# Patient Record
Sex: MALE | Race: WHITE | NOT HISPANIC OR LATINO | Employment: OTHER | URBAN - METROPOLITAN AREA
[De-identification: names, ages, dates, MRNs, and addresses within clinical notes are randomized per-mention and may not be internally consistent; named-entity substitution may affect disease eponyms.]

---

## 2023-09-05 DIAGNOSIS — M54.50 CHRONIC LOW BACK PAIN, UNSPECIFIED BACK PAIN LATERALITY, UNSPECIFIED WHETHER SCIATICA PRESENT: Primary | ICD-10-CM

## 2023-09-05 DIAGNOSIS — G89.29 CHRONIC LOW BACK PAIN, UNSPECIFIED BACK PAIN LATERALITY, UNSPECIFIED WHETHER SCIATICA PRESENT: Primary | ICD-10-CM

## 2023-09-05 PROBLEM — R53.83 FATIGUE: Status: ACTIVE | Noted: 2023-09-05

## 2023-09-05 PROBLEM — M79.89 LEFT LEG SWELLING: Status: ACTIVE | Noted: 2023-09-05

## 2023-09-05 PROBLEM — M25.519 SHOULDER PAIN: Status: ACTIVE | Noted: 2023-09-05

## 2023-09-05 PROBLEM — I71.40 AAA (ABDOMINAL AORTIC ANEURYSM) (CMS-HCC): Status: ACTIVE | Noted: 2023-09-05

## 2023-09-05 PROBLEM — M67.919 DISORDER OF ROTATOR CUFF: Status: ACTIVE | Noted: 2023-09-05

## 2023-09-05 PROBLEM — I73.9 PAD (PERIPHERAL ARTERY DISEASE) (CMS-HCC): Status: ACTIVE | Noted: 2023-09-05

## 2023-09-05 PROBLEM — M50.30 DDD (DEGENERATIVE DISC DISEASE), CERVICAL: Status: ACTIVE | Noted: 2023-09-05

## 2023-09-05 PROBLEM — G54.6: Status: ACTIVE | Noted: 2023-09-05

## 2023-09-05 PROBLEM — R39.89 ABNORMAL PROSTATE EXAM: Status: ACTIVE | Noted: 2023-09-05

## 2023-09-05 PROBLEM — N40.0 BPH (BENIGN PROSTATIC HYPERPLASIA): Status: ACTIVE | Noted: 2023-09-05

## 2023-09-05 PROBLEM — S30.1XXA GROIN HEMATOMA: Status: ACTIVE | Noted: 2023-09-05

## 2023-09-05 PROBLEM — T81.89XA PROBLEM INVOLVING SURGICAL INCISION: Status: ACTIVE | Noted: 2023-09-05

## 2023-09-05 PROBLEM — I73.9 CLAUDICATION, INTERMITTENT (CMS-HCC): Status: ACTIVE | Noted: 2023-09-05

## 2023-09-05 PROBLEM — R10.30 SEVERE GROIN PAIN: Status: ACTIVE | Noted: 2023-09-05

## 2023-09-05 PROBLEM — I70.202 FEMORAL ARTERY OCCLUSION, LEFT (CMS-HCC): Status: ACTIVE | Noted: 2023-09-05

## 2023-09-05 PROBLEM — C34.91 ADENOCARCINOMA OF RIGHT LUNG (MULTI): Status: ACTIVE | Noted: 2023-09-05

## 2023-09-05 PROBLEM — R53.83 MALAISE AND FATIGUE: Status: ACTIVE | Noted: 2023-09-05

## 2023-09-05 PROBLEM — G89.18 POST-OP PAIN: Status: ACTIVE | Noted: 2023-09-05

## 2023-09-05 PROBLEM — L30.1 DYSHIDROSIS: Status: ACTIVE | Noted: 2023-09-05

## 2023-09-05 PROBLEM — R20.2 PARESTHESIAS: Status: ACTIVE | Noted: 2023-09-05

## 2023-09-05 PROBLEM — R53.81 MALAISE AND FATIGUE: Status: ACTIVE | Noted: 2023-09-05

## 2023-09-05 PROBLEM — R30.0 DYSURIA: Status: ACTIVE | Noted: 2023-09-05

## 2023-09-05 PROBLEM — E78.5 HYPERLIPIDEMIA: Status: ACTIVE | Noted: 2023-09-05

## 2023-09-05 PROBLEM — R60.0 EDEMA LEG: Status: ACTIVE | Noted: 2023-09-05

## 2023-09-05 PROBLEM — Z85.118 HISTORY OF LUNG CANCER: Status: ACTIVE | Noted: 2023-09-05

## 2023-09-05 PROBLEM — R07.81 RIB PAIN: Status: ACTIVE | Noted: 2023-09-05

## 2023-09-05 PROBLEM — Z97.8 USES PROSTHESIS: Status: ACTIVE | Noted: 2023-09-05

## 2023-09-05 PROBLEM — S78.111A UNILATERAL AKA, RIGHT (MULTI): Status: ACTIVE | Noted: 2023-09-05

## 2023-09-05 PROBLEM — R39.9: Status: ACTIVE | Noted: 2023-09-05

## 2023-09-05 PROBLEM — M79.605 PAIN OF LEFT LOWER EXTREMITY: Status: ACTIVE | Noted: 2023-09-05

## 2023-09-05 PROBLEM — J44.9 COPD (CHRONIC OBSTRUCTIVE PULMONARY DISEASE) (MULTI): Status: ACTIVE | Noted: 2023-09-05

## 2023-09-05 PROBLEM — R26.2 DIFFICULTY WALKING: Status: ACTIVE | Noted: 2023-09-05

## 2023-09-05 PROBLEM — L71.9 ROSACEA: Status: ACTIVE | Noted: 2023-09-05

## 2023-09-05 PROBLEM — F41.9 ANXIETY: Status: ACTIVE | Noted: 2023-09-05

## 2023-09-05 PROBLEM — M54.2 CERVICAL PAIN: Status: ACTIVE | Noted: 2023-09-05

## 2023-09-05 PROBLEM — R73.9 HYPERGLYCEMIA: Status: ACTIVE | Noted: 2023-09-05

## 2023-09-05 PROBLEM — L21.0 SEBORRHEA CAPITIS: Status: ACTIVE | Noted: 2023-09-05

## 2023-09-05 PROBLEM — M54.16 ACUTE LUMBAR RADICULOPATHY: Status: ACTIVE | Noted: 2023-09-05

## 2023-09-05 PROBLEM — R53.1 WEAKNESS GENERALIZED: Status: ACTIVE | Noted: 2023-09-05

## 2023-09-05 PROBLEM — I25.10 CAD (CORONARY ARTERY DISEASE): Status: ACTIVE | Noted: 2023-09-05

## 2023-09-05 PROBLEM — K40.90 INGUINAL HERNIA, LEFT: Status: ACTIVE | Noted: 2023-09-05

## 2023-09-05 PROBLEM — R07.89 PAIN, CHEST WALL: Status: ACTIVE | Noted: 2023-09-05

## 2023-09-05 PROBLEM — K42.9 UMBILICAL HERNIA: Status: ACTIVE | Noted: 2023-09-05

## 2023-09-05 PROBLEM — M79.604 PAIN OF RIGHT LOWER EXTREMITY: Status: ACTIVE | Noted: 2023-09-05

## 2023-09-05 PROBLEM — S20.20XA CONTUSION, TRUNK: Status: ACTIVE | Noted: 2023-09-05

## 2023-09-05 PROBLEM — R10.9 ABDOMINAL PAIN: Status: ACTIVE | Noted: 2023-09-05

## 2023-09-05 PROBLEM — R58 ECCHYMOSIS: Status: ACTIVE | Noted: 2023-09-05

## 2023-09-05 PROBLEM — J02.9 PHARYNGITIS: Status: RESOLVED | Noted: 2023-09-05 | Resolved: 2023-09-05

## 2023-09-05 PROBLEM — M62.830 BACK MUSCLE SPASM: Status: ACTIVE | Noted: 2023-09-05

## 2023-09-05 RX ORDER — DEXAMETHASONE SODIUM PHOSPHATE 1 MG/ML
SOLUTION/ DROPS OPHTHALMIC
COMMUNITY
Start: 2022-05-09 | End: 2024-01-31 | Stop reason: ALTCHOICE

## 2023-09-05 RX ORDER — MOMETASONE FUROATE 200 UG/1
AEROSOL RESPIRATORY (INHALATION)
COMMUNITY
Start: 2022-02-01 | End: 2023-12-02

## 2023-09-05 RX ORDER — ALBUTEROL SULFATE 90 UG/1
AEROSOL, METERED RESPIRATORY (INHALATION)
COMMUNITY
Start: 2020-07-14

## 2023-09-05 RX ORDER — ACETAMINOPHEN, DIPHENHYDRAMINE HCL, PHENYLEPHRINE HCL 325; 25; 5 MG/1; MG/1; MG/1
TABLET ORAL
COMMUNITY
End: 2024-01-31 | Stop reason: ALTCHOICE

## 2023-09-05 RX ORDER — ASPIRIN 81 MG/1
1 TABLET ORAL DAILY
COMMUNITY
Start: 2021-05-28 | End: 2024-01-31 | Stop reason: ALTCHOICE

## 2023-09-05 RX ORDER — OLOPATADINE HYDROCHLORIDE 2 MG/ML
SOLUTION/ DROPS OPHTHALMIC
COMMUNITY
End: 2024-01-31 | Stop reason: ALTCHOICE

## 2023-09-05 RX ORDER — CLOBETASOL PROPIONATE 0.46 MG/ML
SOLUTION TOPICAL
COMMUNITY

## 2023-09-05 RX ORDER — TIOTROPIUM BROMIDE AND OLODATEROL 3.124; 2.736 UG/1; UG/1
SPRAY, METERED RESPIRATORY (INHALATION)
COMMUNITY
Start: 2022-05-03 | End: 2023-11-02 | Stop reason: ALTCHOICE

## 2023-09-05 RX ORDER — TIOTROPIUM BROMIDE INHALATION SPRAY 3.12 UG/1
2 SPRAY, METERED RESPIRATORY (INHALATION) DAILY
COMMUNITY
Start: 2023-05-24

## 2023-09-05 RX ORDER — CLOPIDOGREL BISULFATE 75 MG/1
75 TABLET ORAL DAILY
COMMUNITY
End: 2024-02-22

## 2023-09-05 RX ORDER — PEMBROLIZUMAB 25 MG/ML
INJECTION, SOLUTION INTRAVENOUS
COMMUNITY
End: 2023-11-02 | Stop reason: ALTCHOICE

## 2023-09-05 RX ORDER — CYCLOBENZAPRINE HCL 10 MG
10 TABLET ORAL 3 TIMES DAILY PRN
COMMUNITY
End: 2023-12-02

## 2023-09-05 RX ORDER — ATORVASTATIN CALCIUM 40 MG/1
1 TABLET, FILM COATED ORAL DAILY
COMMUNITY
Start: 2019-06-06 | End: 2024-01-31 | Stop reason: ALTCHOICE

## 2023-09-05 RX ORDER — TRIAMCINOLONE ACETONIDE 0.25 MG/G
CREAM TOPICAL
COMMUNITY
Start: 2023-02-23

## 2023-09-05 RX ORDER — CYCLOBENZAPRINE HCL 10 MG
10 TABLET ORAL 3 TIMES DAILY PRN
Qty: 90 TABLET | Refills: 0 | Status: SHIPPED | OUTPATIENT
Start: 2023-09-05 | End: 2023-11-02 | Stop reason: SDUPTHER

## 2023-09-05 RX ORDER — TIOTROPIUM BROMIDE 18 UG/1
CAPSULE ORAL; RESPIRATORY (INHALATION)
COMMUNITY
Start: 2022-11-11 | End: 2023-11-02 | Stop reason: ALTCHOICE

## 2023-09-05 RX ORDER — ACETAMINOPHEN 325 MG/1
TABLET ORAL
COMMUNITY
Start: 2021-05-21

## 2023-09-05 RX ORDER — FOLIC ACID 1 MG/1
1 TABLET ORAL DAILY
COMMUNITY
End: 2024-01-31 | Stop reason: ALTCHOICE

## 2023-09-05 RX ORDER — PROCHLORPERAZINE MALEATE 10 MG
1 TABLET ORAL EVERY 8 HOURS PRN
COMMUNITY
Start: 2022-03-18 | End: 2024-01-31 | Stop reason: ALTCHOICE

## 2023-09-05 RX ORDER — TAMSULOSIN HYDROCHLORIDE 0.4 MG/1
0.8 CAPSULE ORAL NIGHTLY
COMMUNITY
End: 2024-02-28 | Stop reason: SDUPTHER

## 2023-09-05 RX ORDER — ONDANSETRON 4 MG/1
TABLET, FILM COATED ORAL
COMMUNITY
End: 2024-01-31 | Stop reason: ALTCHOICE

## 2023-09-05 RX ORDER — KETOCONAZOLE 20 MG/ML
SHAMPOO, SUSPENSION TOPICAL
COMMUNITY

## 2023-09-05 RX ORDER — PREDNISONE 5 MG/1
TABLET ORAL
COMMUNITY
Start: 2022-07-12 | End: 2023-11-02 | Stop reason: ALTCHOICE

## 2023-09-05 RX ORDER — DEXAMETHASONE 4 MG/1
TABLET ORAL
COMMUNITY
Start: 2022-03-18 | End: 2024-01-31 | Stop reason: ALTCHOICE

## 2023-09-25 DIAGNOSIS — Z12.5 SCREENING FOR PROSTATE CANCER: ICD-10-CM

## 2023-09-25 DIAGNOSIS — Z13.220 LIPID SCREENING: Primary | ICD-10-CM

## 2023-09-25 DIAGNOSIS — L71.9 ROSACEA: ICD-10-CM

## 2023-09-25 RX ORDER — METRONIDAZOLE 7.5 MG/G
15 GEL TOPICAL 2 TIMES DAILY
COMMUNITY
End: 2023-09-25 | Stop reason: SDUPTHER

## 2023-09-26 RX ORDER — METRONIDAZOLE 7.5 MG/G
1 GEL TOPICAL 2 TIMES DAILY
Qty: 30 G | Refills: 0 | Status: SHIPPED | OUTPATIENT
Start: 2023-09-26 | End: 2023-11-02 | Stop reason: ALTCHOICE

## 2023-10-25 ENCOUNTER — LAB (OUTPATIENT)
Dept: LAB | Facility: LAB | Age: 68
End: 2023-10-25
Payer: MEDICARE

## 2023-10-25 DIAGNOSIS — L71.9 ROSACEA: ICD-10-CM

## 2023-10-25 DIAGNOSIS — Z12.5 SCREENING FOR PROSTATE CANCER: ICD-10-CM

## 2023-10-25 DIAGNOSIS — Z13.220 LIPID SCREENING: ICD-10-CM

## 2023-10-25 LAB
CHOLEST SERPL-MCNC: 177 MG/DL (ref 0–199)
CHOLESTEROL/HDL RATIO: 6.4
HDLC SERPL-MCNC: 27.8 MG/DL
LDLC SERPL CALC-MCNC: 113 MG/DL
NON HDL CHOLESTEROL: 149 MG/DL (ref 0–149)
TRIGL SERPL-MCNC: 179 MG/DL (ref 0–149)
VLDL: 36 MG/DL (ref 0–40)

## 2023-10-25 PROCEDURE — 36415 COLL VENOUS BLD VENIPUNCTURE: CPT

## 2023-10-25 PROCEDURE — G0103 PSA SCREENING: HCPCS

## 2023-10-25 PROCEDURE — 80061 LIPID PANEL: CPT

## 2023-10-26 DIAGNOSIS — R97.20 ELEVATED PSA: Primary | ICD-10-CM

## 2023-10-26 LAB — PSA SERPL-MCNC: 4.28 NG/ML

## 2023-11-02 ENCOUNTER — OFFICE VISIT (OUTPATIENT)
Dept: PRIMARY CARE | Facility: CLINIC | Age: 68
End: 2023-11-02
Payer: MEDICARE

## 2023-11-02 VITALS
OXYGEN SATURATION: 96 % | SYSTOLIC BLOOD PRESSURE: 150 MMHG | HEIGHT: 67 IN | HEART RATE: 58 BPM | BODY MASS INDEX: 30.45 KG/M2 | WEIGHT: 194 LBS | DIASTOLIC BLOOD PRESSURE: 80 MMHG

## 2023-11-02 DIAGNOSIS — J02.9 PHARYNGITIS, UNSPECIFIED ETIOLOGY: ICD-10-CM

## 2023-11-02 DIAGNOSIS — R97.20 ELEVATED PSA: ICD-10-CM

## 2023-11-02 DIAGNOSIS — N40.1 BENIGN PROSTATIC HYPERPLASIA WITH URINARY FREQUENCY: ICD-10-CM

## 2023-11-02 DIAGNOSIS — J43.9 PULMONARY EMPHYSEMA, UNSPECIFIED EMPHYSEMA TYPE (MULTI): ICD-10-CM

## 2023-11-02 DIAGNOSIS — C34.91 ADENOCARCINOMA OF RIGHT LUNG (MULTI): Primary | ICD-10-CM

## 2023-11-02 DIAGNOSIS — R35.0 BENIGN PROSTATIC HYPERPLASIA WITH URINARY FREQUENCY: ICD-10-CM

## 2023-11-02 DIAGNOSIS — B37.9 CANDIDIASIS: ICD-10-CM

## 2023-11-02 PROBLEM — G54.6: Status: RESOLVED | Noted: 2023-09-05 | Resolved: 2023-11-02

## 2023-11-02 PROCEDURE — 1159F MED LIST DOCD IN RCRD: CPT | Performed by: FAMILY MEDICINE

## 2023-11-02 PROCEDURE — 1125F AMNT PAIN NOTED PAIN PRSNT: CPT | Performed by: FAMILY MEDICINE

## 2023-11-02 PROCEDURE — 1036F TOBACCO NON-USER: CPT | Performed by: FAMILY MEDICINE

## 2023-11-02 PROCEDURE — 99214 OFFICE O/P EST MOD 30 MIN: CPT | Performed by: FAMILY MEDICINE

## 2023-11-02 PROCEDURE — 1160F RVW MEDS BY RX/DR IN RCRD: CPT | Performed by: FAMILY MEDICINE

## 2023-11-02 RX ORDER — NYSTATIN 100000 [USP'U]/ML
500000 SUSPENSION ORAL 4 TIMES DAILY
Qty: 200 ML | Refills: 0 | Status: SHIPPED | OUTPATIENT
Start: 2023-11-02 | End: 2023-11-12

## 2023-11-02 ASSESSMENT — ENCOUNTER SYMPTOMS: SINUS PAIN: 0

## 2023-11-02 NOTE — PATIENT INSTRUCTIONS
Elevated PSA and BPH: See urology, referral is in the computer : Olivier Gould     You may take over-the-counter medications as needed for symptom relief.  Call the office if your symptoms worsen such as high fever and worsening cough or increase in symptoms.       Lung cancer continued follow-up with oncology    Candidiasis: Nystatin as directed   Referred To Plastics For Closure Text (Leave Blank If You Do Not Want): After obtaining clear surgical margins the patient was sent to plastics for surgical repair.  The patient understands they will receive post-surgical care and follow-up from the referring physician's office.

## 2023-11-02 NOTE — PROGRESS NOTES
"Subjective   Patient ID: Lauri Dolan is a 68 y.o. male who presents for Sore Throat (Pt in for sore throat.  Pt states his throat is scratchy, having trouble swallowing pills at night.  Onset few months.   Pt asking about urologist.  ).    Phlegm in throat   Trouble getting pills down   No pain with swallowing ,    For a few months      BPH : some burning at beginning of urination   Still have frequency   Elevated PSA       COPD: Here for follow-up on COPD.  Not taking spiriva much  occ albuterol  , seeing Dr Wright   Patient is currently taking maintenance inhalers, occasional symptomatic inhalers.  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 Negative for congestion and sinus pain.        Objective   /80   Pulse 58   Ht 1.702 m (5' 7\")   Wt 88 kg (194 lb)   SpO2 96%   BMI 30.38 kg/m²     Physical Exam  Constitutional:       Appearance: Normal appearance.   HENT:      Head: Normocephalic and atraumatic.      Right Ear: Tympanic membrane and ear canal normal.      Left Ear: Tympanic membrane and ear canal normal.      Nose: No nasal deformity.      Right Sinus: No maxillary sinus tenderness or frontal sinus tenderness.      Left Sinus: No maxillary sinus tenderness or frontal sinus tenderness.      Mouth/Throat:      Mouth: No oral lesions.      Tongue: No lesions.      Comments: Right eye red uvula, back of the throat, pharyngeal and " parapharyngeal area  White patches along the parapharyngeal area  Neck:      Comments: Thyroid midline no nodules  Cardiovascular:      Rate and Rhythm: Normal rate and regular rhythm.   Pulmonary:      Effort: Pulmonary effort is normal.      Breath sounds: Normal breath sounds.   Musculoskeletal:      Cervical back: No tenderness.   Lymphadenopathy:      Cervical: No cervical adenopathy.   Neurological:      Mental Status: He is alert.   Psychiatric:         Mood and Affect: Mood normal.         Assessment/Plan   Problem List Items Addressed This Visit             ICD-10-CM    Adenocarcinoma of right lung (CMS/[facility]) - Primary C34.91    BPH (benign prostatic hyperplasia) N40.0    COPD (chronic obstructive pulmonary disease) (CMS/[facility]) J44.9     Other Visit Diagnoses         Codes    Elevated PSA     R97.20    Pharyngitis, unspecified etiology     J02.9    Relevant Medications    nystatin (Mycostatin) 100,000 unit/mL suspension    Candidiasis     B37.9    Relevant Medications    nystatin (Mycostatin) 100,000 unit/mL suspension

## 2023-11-24 ENCOUNTER — LAB (OUTPATIENT)
Dept: LAB | Facility: HOSPITAL | Age: 68
End: 2023-11-24
Payer: MEDICARE

## 2023-11-24 ENCOUNTER — HOSPITAL ENCOUNTER (OUTPATIENT)
Dept: RADIOLOGY | Facility: HOSPITAL | Age: 68
Discharge: HOME | End: 2023-11-24
Payer: MEDICARE

## 2023-11-24 DIAGNOSIS — C34.31 MALIGNANT NEOPLASM OF LOWER LOBE, RIGHT BRONCHUS OR LUNG (MULTI): ICD-10-CM

## 2023-11-24 DIAGNOSIS — C34.90 MALIGNANT NEOPLASM OF UNSPECIFIED PART OF UNSPECIFIED BRONCHUS OR LUNG (MULTI): ICD-10-CM

## 2023-11-24 DIAGNOSIS — E03.9 HYPOTHYROIDISM, UNSPECIFIED: ICD-10-CM

## 2023-11-24 LAB
ALBUMIN SERPL BCP-MCNC: 4.1 G/DL (ref 3.4–5)
ALP SERPL-CCNC: 57 U/L (ref 33–136)
ALT SERPL W P-5'-P-CCNC: 26 U/L (ref 10–52)
ANION GAP SERPL CALC-SCNC: 10 MMOL/L (ref 10–20)
AST SERPL W P-5'-P-CCNC: 18 U/L (ref 9–39)
BASOPHILS # BLD AUTO: 0.02 X10*3/UL (ref 0–0.1)
BASOPHILS NFR BLD AUTO: 0.5 %
BILIRUB SERPL-MCNC: 0.5 MG/DL (ref 0–1.2)
BUN SERPL-MCNC: 11 MG/DL (ref 6–23)
CALCIUM SERPL-MCNC: 8.3 MG/DL (ref 8.6–10.3)
CHLORIDE SERPL-SCNC: 104 MMOL/L (ref 98–107)
CO2 SERPL-SCNC: 27 MMOL/L (ref 21–32)
CREAT SERPL-MCNC: 0.6 MG/DL (ref 0.5–1.3)
EOSINOPHIL # BLD AUTO: 0.14 X10*3/UL (ref 0–0.7)
EOSINOPHIL NFR BLD AUTO: 3.2 %
ERYTHROCYTE [DISTWIDTH] IN BLOOD BY AUTOMATED COUNT: 12.6 % (ref 11.5–14.5)
GFR SERPL CREATININE-BSD FRML MDRD: >90 ML/MIN/1.73M*2
GLUCOSE SERPL-MCNC: 100 MG/DL (ref 74–99)
HCT VFR BLD AUTO: 42.5 % (ref 41–52)
HGB BLD-MCNC: 14 G/DL (ref 13.5–17.5)
IMM GRANULOCYTES # BLD AUTO: 0.01 X10*3/UL (ref 0–0.7)
IMM GRANULOCYTES NFR BLD AUTO: 0.2 % (ref 0–0.9)
LYMPHOCYTES # BLD AUTO: 0.74 X10*3/UL (ref 1.2–4.8)
LYMPHOCYTES NFR BLD AUTO: 16.8 %
MCH RBC QN AUTO: 32 PG (ref 26–34)
MCHC RBC AUTO-ENTMCNC: 32.9 G/DL (ref 32–36)
MCV RBC AUTO: 97 FL (ref 80–100)
MONOCYTES # BLD AUTO: 0.38 X10*3/UL (ref 0.1–1)
MONOCYTES NFR BLD AUTO: 8.6 %
NEUTROPHILS # BLD AUTO: 3.12 X10*3/UL (ref 1.2–7.7)
NEUTROPHILS NFR BLD AUTO: 70.7 %
PLATELET # BLD AUTO: 165 X10*3/UL (ref 150–450)
POTASSIUM SERPL-SCNC: 4.2 MMOL/L (ref 3.5–5.3)
PROT SERPL-MCNC: 5.8 G/DL (ref 6.4–8.2)
RBC # BLD AUTO: 4.37 X10*6/UL (ref 4.5–5.9)
SODIUM SERPL-SCNC: 137 MMOL/L (ref 136–145)
TSH SERPL-ACNC: 0.74 MIU/L (ref 0.44–3.98)
WBC # BLD AUTO: 4.4 X10*3/UL (ref 4.4–11.3)

## 2023-11-24 PROCEDURE — 85025 COMPLETE CBC W/AUTO DIFF WBC: CPT

## 2023-11-24 PROCEDURE — 80053 COMPREHEN METABOLIC PANEL: CPT

## 2023-11-24 PROCEDURE — 2550000001 HC RX 255 CONTRASTS: Performed by: INTERNAL MEDICINE

## 2023-11-24 PROCEDURE — 71260 CT THORAX DX C+: CPT | Performed by: RADIOLOGY

## 2023-11-24 PROCEDURE — 36415 COLL VENOUS BLD VENIPUNCTURE: CPT

## 2023-11-24 PROCEDURE — 71260 CT THORAX DX C+: CPT

## 2023-11-24 PROCEDURE — 84443 ASSAY THYROID STIM HORMONE: CPT

## 2023-11-24 PROCEDURE — 82565 ASSAY OF CREATININE: CPT | Mod: OUT

## 2023-11-24 RX ADMIN — IOHEXOL 75 ML: 350 INJECTION, SOLUTION INTRAVENOUS at 11:37

## 2023-12-01 ENCOUNTER — OFFICE VISIT (OUTPATIENT)
Dept: HEMATOLOGY/ONCOLOGY | Facility: CLINIC | Age: 68
End: 2023-12-01
Payer: MEDICARE

## 2023-12-01 VITALS
DIASTOLIC BLOOD PRESSURE: 73 MMHG | BODY MASS INDEX: 25.53 KG/M2 | WEIGHT: 172.4 LBS | HEART RATE: 86 BPM | RESPIRATION RATE: 16 BRPM | HEIGHT: 69 IN | TEMPERATURE: 97.7 F | OXYGEN SATURATION: 95 % | SYSTOLIC BLOOD PRESSURE: 113 MMHG

## 2023-12-01 DIAGNOSIS — C34.90 MALIGNANT NEOPLASM OF UNSPECIFIED PART OF UNSPECIFIED BRONCHUS OR LUNG (MULTI): ICD-10-CM

## 2023-12-01 DIAGNOSIS — J43.9 PULMONARY EMPHYSEMA, UNSPECIFIED EMPHYSEMA TYPE (MULTI): Primary | ICD-10-CM

## 2023-12-01 DIAGNOSIS — C34.31 MALIGNANT NEOPLASM OF LOWER LOBE, RIGHT BRONCHUS OR LUNG (MULTI): ICD-10-CM

## 2023-12-01 DIAGNOSIS — B37.0 THRUSH: Primary | ICD-10-CM

## 2023-12-01 DIAGNOSIS — M50.30 DDD (DEGENERATIVE DISC DISEASE), CERVICAL: ICD-10-CM

## 2023-12-01 DIAGNOSIS — E03.9 HYPOTHYROIDISM, UNSPECIFIED: ICD-10-CM

## 2023-12-01 PROBLEM — I74.3: Status: ACTIVE | Noted: 2023-12-01

## 2023-12-01 PROBLEM — I21.9 MYOCARDIAL INFARCTION (MULTI): Status: ACTIVE | Noted: 2023-12-01

## 2023-12-01 PROBLEM — R32 UNSPECIFIED URINARY INCONTINENCE: Status: ACTIVE | Noted: 2022-02-28

## 2023-12-01 PROBLEM — J20.9 ACUTE BRONCHITIS: Status: RESOLVED | Noted: 2023-12-01 | Resolved: 2023-12-01

## 2023-12-01 PROBLEM — R58 RETROPERITONEAL HEMORRHAGE: Status: ACTIVE | Noted: 2023-12-01

## 2023-12-01 PROBLEM — R06.02 SHORTNESS OF BREATH: Status: ACTIVE | Noted: 2023-12-01

## 2023-12-01 PROBLEM — R05.9 COUGH: Status: RESOLVED | Noted: 2023-12-01 | Resolved: 2023-12-01

## 2023-12-01 PROBLEM — R06.2 WHEEZING: Status: RESOLVED | Noted: 2023-12-01 | Resolved: 2023-12-01

## 2023-12-01 PROBLEM — B37.9 CANDIDIASIS: Status: RESOLVED | Noted: 2023-12-01 | Resolved: 2023-12-01

## 2023-12-01 PROCEDURE — 1159F MED LIST DOCD IN RCRD: CPT | Performed by: INTERNAL MEDICINE

## 2023-12-01 PROCEDURE — 99214 OFFICE O/P EST MOD 30 MIN: CPT | Performed by: INTERNAL MEDICINE

## 2023-12-01 PROCEDURE — 1036F TOBACCO NON-USER: CPT | Performed by: INTERNAL MEDICINE

## 2023-12-01 PROCEDURE — 1160F RVW MEDS BY RX/DR IN RCRD: CPT | Performed by: INTERNAL MEDICINE

## 2023-12-01 PROCEDURE — 1126F AMNT PAIN NOTED NONE PRSNT: CPT | Performed by: INTERNAL MEDICINE

## 2023-12-01 RX ORDER — FLUCONAZOLE 150 MG/1
150 TABLET ORAL DAILY
Qty: 14 TABLET | Refills: 1 | Status: SHIPPED | OUTPATIENT
Start: 2023-12-01 | End: 2023-12-15

## 2023-12-01 RX ORDER — TIOTROPIUM BROMIDE AND OLODATEROL 3.124; 2.736 UG/1; UG/1
SPRAY, METERED RESPIRATORY (INHALATION)
COMMUNITY
Start: 2019-07-08

## 2023-12-01 ASSESSMENT — ENCOUNTER SYMPTOMS
DEPRESSION: 0
LOSS OF SENSATION IN FEET: 0
OCCASIONAL FEELINGS OF UNSTEADINESS: 1

## 2023-12-01 ASSESSMENT — NCCN CANCER DISTRESS MANAGEMENT: NCCN PRACTICAL CONCERNS: 12

## 2023-12-01 ASSESSMENT — PAIN SCALES - GENERAL: PAINLEVEL: 0-NO PAIN

## 2023-12-01 NOTE — PATIENT INSTRUCTIONS
Today you met with Dr. Mtz.  Your recent imaging was discussed.  Repeat imaging and MD visit in 6 months.  Call for any questions or concerns.  [phone]

## 2023-12-01 NOTE — PROGRESS NOTES
Patient ID: Lauri Zuniga is a 68 y.o. male.  Referring Physician: No referring provider defined for this encounter.  Primary Care Provider: Liz Fraser MD      Subjective    HPI        Cancer History:   Treatment Synopsis:    He underwent wedge resection and lymph node dissection in August 2018.  Tumor was 1.3 cm lymph nodes were negative.     Past medical history includes hypertension coronary disease.  He recently quit smoking.           History of Present Illness:      ID Statement:    LAURI ZUNIGA is a 68 year old Male        Interval History:    Patient presents for follow treatment with his wife. Currently on maintenance Keytruda, reports no worsening in  muscle and joint pain, since beiung off ofKeytruda.  Has dry irritated eyes, otherwise doing well. Appetite and energy good.      Review of Systems:   Review of Systems:    Constitutional;  No fever, chills, anorexia or weight loss  Eyes:  No blurry vision, diplopia or vision loss  ENT:  No nasal congestion, earache or sore throat  Respiratory:  No cough, SOB, hemoptysis or wheezing  Cardiac:  No Chest Pain or palpatations, dyspnea on exertion, or orthopnea  Gastrointestinal:  No abdominal pain, nausea, vomiting, diarrhea, melena, hemoptysis, or hematochezia  Genitourinary:  No dysuria, hematuria, frequency, urgency or flank pain  Musculoskeletal:  No arthralgia, myalgia, or weakness  Neurological:  No dizziness, light-headedness, headache, or syncope  Psychiatric:  No history of anxiety, depression, hallucinations  Skin:  No Rash or pruritis  Endocrine:  No heat or cold intolerance, polyuria, polydipsia  Hematologic:  No bruising      Family history is negative for first-degree relatives with cancer including breast, ovarian, colon or hematologic malignancies.      Review of Systems   Constitutional: Negative.    HENT:  Negative.     Eyes: Negative.    Respiratory: Negative.     Cardiovascular: Negative.    Gastrointestinal: Negative.      "    Objective   BSA: 1.95 meters squared  /73 (BP Location: Left arm)   Pulse 86   Temp 36.5 °C (97.7 °F)   Resp 16   Ht 1.75 m (5' 8.9\")   Wt 78.2 kg (172 lb 6.4 oz)   SpO2 95%   BMI 25.53 kg/m²     No family history on file.  Oncology History    No history exists.   CHEST WALL AND OSSEOUS STRUCTURES:  The chest wall soft tissues are unremarkable. Status post median  sternotomy. No acute osseous abnormalities or suspicious osseous  lesions.      IMPRESSION:  1.  Suspected evolving postradiation fibrosis of the right lower lobe  as described above. Otherwise, no new evidence of malignancy within  the thorax.  2. No new acute cardiopulmonary process.  3. 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  4.  Severe coronary artery calcifications, indicating the presence of  coronary artery disease. If the patient has associated symptoms  recommend management as per chest pain guidelines.     I personally reviewed the images/study and I agree with the resident  findings as stated. This study was interpreted at [place], [place].      MACRO:  None      Signed by: Deny Allen 11/25/2023    Lauri Dolan  reports that he quit smoking about 3 years ago. His smoking use included cigarettes. He smoked an average of 1 pack per day. He has quit using smokeless tobacco.  His smokeless tobacco use included snuff.  He  reports that he does not currently use alcohol.  He  reports that he does not currently use drugs.    Physical Exam  Constitutional:       Appearance: Normal appearance.   HENT:      Head: Normocephalic and atraumatic.      Right Ear: External ear normal.      Left Ear: External ear normal.   Eyes:      Extraocular Movements: Extraocular movements intact.      Pupils: Pupils are equal, round, and reactive to light.   Cardiovascular:      Rate and Rhythm: Normal rate and regular rhythm.   Pulmonary: "      Effort: Pulmonary effort is normal.      Breath sounds: Normal breath sounds.   Abdominal:      General: Abdomen is flat.      Palpations: Abdomen is soft.   Musculoskeletal:      Cervical back: Neck supple.   Neurological:      Mental Status: He is alert.         Performance Status:  Symptomatic; fully ambulatory    Assessment/Plan      Recent history of non-small cell lung cancer right status post chemoradiation.  No evidence of disease recurrence or persistence.  Will continue surveillance.  CT chest ordered in 6 months with visit with MD thereafter.  Complaining of oropharyngeal thrush.  Has an ENT visit already planned and I encouraged him to pursue ENT visitation as indirect laryngoscopy would be helpful in this previous smoker.  Fluconazole prescribed.    Diagnoses and all orders for this visit:  Thrush  -     fluconazole (Diflucan) 150 mg tablet; Take 1 tablet (150 mg) by mouth once daily for 14 days.  -     CT chest w IV contrast; Future  -     Clinic Appointment Request Follow up; Future  Malignant neoplasm of lower lobe, right bronchus or lung (CMS/HCC)  -     Comprehensive Metabolic Panel; Future  -     CBC and Auto Differential; Future  -     TSH with reflex to Free T4 if abnormal; Future  -     fluconazole (Diflucan) 150 mg tablet; Take 1 tablet (150 mg) by mouth once daily for 14 days.  -     CT chest w IV contrast; Future  -     Clinic Appointment Request Follow up; Future  Malignant neoplasm of unspecified part of unspecified bronchus or lung (CMS/HCC)  -     Comprehensive Metabolic Panel; Future  -     CBC and Auto Differential; Future  -     TSH with reflex to Free T4 if abnormal; Future  -     fluconazole (Diflucan) 150 mg tablet; Take 1 tablet (150 mg) by mouth once daily for 14 days.  -     CT chest w IV contrast; Future  -     Clinic Appointment Request Follow up; Future  Hypothyroidism, unspecified  -     Comprehensive Metabolic Panel; Future  -     CBC and Auto Differential; Future  -      TSH with reflex to Free T4 if abnormal; Future  -     fluconazole (Diflucan) 150 mg tablet; Take 1 tablet (150 mg) by mouth once daily for 14 days.  -     CT chest w IV contrast; Future  -     Clinic Appointment Request Follow up; Future             Aman-Mauro Mtz MD

## 2023-12-02 RX ORDER — CYCLOBENZAPRINE HCL 10 MG
10 TABLET ORAL 3 TIMES DAILY PRN
Qty: 90 TABLET | Refills: 2 | Status: SHIPPED | OUTPATIENT
Start: 2023-12-02

## 2023-12-02 RX ORDER — MOMETASONE FUROATE 200 UG/1
AEROSOL RESPIRATORY (INHALATION)
Qty: 39 G | Refills: 3 | Status: SHIPPED | OUTPATIENT
Start: 2023-12-02

## 2023-12-04 ASSESSMENT — ENCOUNTER SYMPTOMS
CARDIOVASCULAR NEGATIVE: 1
RESPIRATORY NEGATIVE: 1
CONSTITUTIONAL NEGATIVE: 1
GASTROINTESTINAL NEGATIVE: 1
EYES NEGATIVE: 1

## 2024-01-03 ENCOUNTER — APPOINTMENT (OUTPATIENT)
Dept: UROLOGY | Facility: CLINIC | Age: 69
End: 2024-01-03
Payer: MEDICARE

## 2024-01-08 ENCOUNTER — LAB REQUISITION (OUTPATIENT)
Dept: LAB | Facility: HOSPITAL | Age: 69
End: 2024-01-08
Payer: MEDICARE

## 2024-01-08 LAB
CREAT SERPL-MCNC: 0.7 MG/DL (ref 0.6–1.3)
GFR SERPL CREATININE-BSD FRML MDRD: >60 ML/MIN/1.73M*2

## 2024-01-10 ENCOUNTER — APPOINTMENT (OUTPATIENT)
Dept: UROLOGY | Facility: CLINIC | Age: 69
End: 2024-01-10
Payer: MEDICARE

## 2024-01-10 ENCOUNTER — OFFICE VISIT (OUTPATIENT)
Dept: OTOLARYNGOLOGY | Facility: CLINIC | Age: 69
End: 2024-01-10
Payer: MEDICARE

## 2024-01-10 VITALS — BODY MASS INDEX: 25.92 KG/M2 | WEIGHT: 175 LBS | TEMPERATURE: 97.3 F | HEIGHT: 69 IN

## 2024-01-10 DIAGNOSIS — H61.23 BILATERAL IMPACTED CERUMEN: ICD-10-CM

## 2024-01-10 DIAGNOSIS — R09.82 POST-NASAL DRIP: ICD-10-CM

## 2024-01-10 DIAGNOSIS — H90.3 SENSORINEURAL HEARING LOSS (SNHL) OF BOTH EARS: ICD-10-CM

## 2024-01-10 DIAGNOSIS — R49.0 HOARSENESS OF VOICE: Primary | ICD-10-CM

## 2024-01-10 DIAGNOSIS — B37.0 ORAL THRUSH: ICD-10-CM

## 2024-01-10 PROCEDURE — 1036F TOBACCO NON-USER: CPT | Performed by: OTOLARYNGOLOGY

## 2024-01-10 PROCEDURE — 31575 DIAGNOSTIC LARYNGOSCOPY: CPT | Performed by: OTOLARYNGOLOGY

## 2024-01-10 PROCEDURE — 1160F RVW MEDS BY RX/DR IN RCRD: CPT | Performed by: OTOLARYNGOLOGY

## 2024-01-10 PROCEDURE — 99204 OFFICE O/P NEW MOD 45 MIN: CPT | Performed by: OTOLARYNGOLOGY

## 2024-01-10 PROCEDURE — 1126F AMNT PAIN NOTED NONE PRSNT: CPT | Performed by: OTOLARYNGOLOGY

## 2024-01-10 PROCEDURE — 1159F MED LIST DOCD IN RCRD: CPT | Performed by: OTOLARYNGOLOGY

## 2024-01-10 ASSESSMENT — PATIENT HEALTH QUESTIONNAIRE - PHQ9
SUM OF ALL RESPONSES TO PHQ9 QUESTIONS 1 & 2: 0
1. LITTLE INTEREST OR PLEASURE IN DOING THINGS: NOT AT ALL
2. FEELING DOWN, DEPRESSED OR HOPELESS: NOT AT ALL

## 2024-01-10 NOTE — PROGRESS NOTES
Chief Complaint   Patient presents with    New Patient Visit     LOV 10/2017 TROUBLE SWALLOWING 8 WKS AGO, HAD THRUSH, ON MEDS, CHRONIC CLEARING THROAT      Date of Evaluation: 1/10/2024   HPI  Lauri Dolan is a 68 y.o. male here for evaluation of his throat.  He had a recent bout of thrush.  He had a history of recurrent lung cancer and was treated with chemotherapy and is doing well at this point.  He feels better after the thrush.  He does have some postnasal drip mild hoarseness and frequent throat clearing.  He does not smoke any longer.  He does wear bilateral hearing aids.  The ears feel plugged       Past Medical History:   Diagnosis Date    Acute bronchitis 12/01/2023    Candidiasis 12/01/2023    Coronary artery disease     Cough 12/01/2023    Lung cancer (CMS/HCC)     Old myocardial infarction 03/26/2019    History of non-ST elevation myocardial infarction (NSTEMI)    Personal history of other diseases of the respiratory system 05/21/2019    History of acute respiratory failure    Personal history of other diseases of the respiratory system 11/23/2021    History of acute bronchitis    Personal history of other malignant neoplasm of skin     History of malignant neoplasm of skin    Pharyngitis 09/05/2023    Wheezing 12/01/2023      Past Surgical History:   Procedure Laterality Date    CT ABDOMEN PELVIS ANGIOGRAM W AND/OR WO IV CONTRAST  3/15/2019    CT ABDOMEN PELVIS ANGIOGRAM W AND/OR WO IV CONTRAST 3/15/2019 [facility] CLINICAL LEGACY    CT ABDOMEN PELVIS ANGIOGRAM W AND/OR WO IV CONTRAST  6/26/2020    CT ABDOMEN PELVIS ANGIOGRAM W AND/OR WO IV CONTRAST 6/26/2020 [facility]CILL[facility]GAC[facility]    CT ABDOMEN PELVIS ANGIOGRAM W AND/OR WO IV CONTRAST  5/13/2021    CT ABDOMEN PELVIS ANGIOGRAM W AND/OR WO IV CONTRAST 5/13/2021 [facility]G AIB [facility]GAC[facility]    CT AORTA AND BILATERAL ILIOFEMORAL RUNOFF ANGIOGRAM W AND/OR WO IV CONTRAST  8/11/2020    CT AORTA AND BILATERAL ILIOFEMORAL RUNOFF ANGIOGRAM W AND/OR WO IV CONTRAST 8/11/2020  [facility] CLINICAL LEGACY    CT AORTA AND BILATERAL ILIOFEMORAL RUNOFF ANGIOGRAM W AND/OR WO IV CONTRAST  2/10/2021    CT AORTA AND BILATERAL ILIOFEMORAL RUNOFF ANGIOGRAM W AND/OR WO IV CONTRAST 2/10/2021 [facility] CLINICAL LEGACY    CT AORTA AND BILATERAL ILIOFEMORAL RUNOFF ANGIOGRAM W AND/OR WO IV CONTRAST  5/19/2021    CT AORTA AND BILATERAL ILIOFEMORAL RUNOFF ANGIOGRAM W AND/OR WO IV CONTRAST 5/19/2021 [facility]Y [facility]    HEMICOLECTOMY  06/18/2018    Hemicolectomy    HERNIA REPAIR  06/18/2018    Inguinal Hernia Repair    OTHER SURGICAL HISTORY  06/03/2019    Coronary artery bypass graft    OTHER SURGICAL HISTORY  05/18/2021    Vascular surgical procedure    OTHER SURGICAL HISTORY  04/12/2022    Lower extremity amputation above knee mid-thigh    OTHER SURGICAL HISTORY  08/31/2020    Femoral endarterectomy    OTHER SURGICAL HISTORY  08/31/2020    Abdominal aortic aneurysm repair endovascular    OTHER SURGICAL HISTORY  08/31/2020    Femoropopliteal bypass    OTHER SURGICAL HISTORY  04/08/2019    Cardiac catheterization          Medications:   Current Outpatient Medications   Medication Instructions    acetaminophen (Tylenol) 325 mg tablet TAKE 1 TO 2 TABLETS EVERY 6 HOURS AS NEEDED.    albuterol 90 mcg/actuation inhaler INHALE 1 TO 2 PUFFS BY MOUTH EVERY 4 TO 6 HOURS AS NEEDED    aspirin 81 mg EC tablet 1 tablet, oral, Daily    atorvastatin (Lipitor) 40 mg tablet 1 tablet, oral, Daily    clobetasol (Temovate) 0.05 % external solution APPLY TO SCALP TWICE DAILY FOR 14 DAYS AS NEEDED FOR FLARES/ITCHING.    clopidogrel (PLAVIX) 75 mg, oral, Daily    cyclobenzaprine (FLEXERIL) 10 mg, oral, 3 times daily PRN    dexAMETHasone (Decadron) 0.1 % ophthalmic solution 1 drop(s) in each affected eye once a day x 5 days after chemo    dexAMETHasone (Decadron) 4 mg tablet TAKE 1 TABLET BY MOUTH TWICE DAILY ON THE DAY BEFORE AND THE DAY AFTER CHEMO TREATMENTS    folic acid (FOLVITE) 1 mg, oral, Daily    ketoconazole (NIZOral) 2 %  "shampoo APPLY TO SCALP EVERY OTHER DAY FOR 5 MINUTES AND THEN RINSE OFF    melatonin 10 mg tablet 1 tab(s) orally once a day (at bedtime), As Needed    mometasone (Asmanex HFA) 200 mcg/actuation HFA aerosol inhaler INHALE 1 TO 2 PUFFS TWICE DAILY    olopatadine (Pataday) 0.2 % ophthalmic solution INSTILL 1 DROP INTO EACH AFFECTED EYE ONCE DAILY    ondansetron (Zofran) 4 mg tablet TAKE 2 TABLETS BY MOUTH EVERY 6 HOURS AS NEEDED FOR NAUSEA    prochlorperazine (Compazine) 10 mg tablet 1 tablet, oral, Every 8 hours PRN    Spiriva Respimat 2.5 mcg/actuation inhaler 2 puffs, inhalation, Daily    tamsulosin (FLOMAX) 0.8 mg, oral, Nightly    tiotropium-olodateroL (Stiolto Respimat) 2.5-2.5 mcg/actuation mist inhaler Stiolto Respimat 2.5-2.5 MCG/ACT Inhalation Aerosol Solution INHALE 2 PUFFS BY MOUTH DAILY Quantity: 4 Refills: 11 Dequan Wright MD Start : 8-Jul-2019 Active    triamcinolone (Kenalog) 0.025 % cream APPLY 2-3 TIMES DAILY TO AFFECTED AREA(S).        Allergies:  Allergies   Allergen Reactions    Azithromycin Nausea And Vomiting, Other and Nausea/vomiting     shaking/tremors, nausea    Penicillins Rash        Physical Exam:  Last Recorded Vitals  Temperature 36.3 °C (97.3 °F), height 1.753 m (5' 9\"), weight 79.4 kg (175 lb).  []General appearance: Well-developed, well-nourished in no acute distress, conversant with normal voice quality    Head/face: No erythema or edema or facial tenderness, and normal facial nerve function bilaterally    External ear: Clear external auditory canals with normal pinnae, bilateral cerumen impactions removed  Tube status: N/A  Middle ear: Tympanic membranes intact and mobile, middle ears normal.  Tympanic membrane perforation: N/A  Mastoid bowl: N/A  Hearing: Normal conversational awareness at normal speech thresholds    Nose visualized using: Nasal endoscopy  Nasal dorsum: Nontraumatic midline appearance  Septum: Midline, nonobstructing  Inferior turbinates: Normal, pink  Secretions: " Dry    Oral cavity and oropharynx: Normal  Teeth: Good condition  Floor of mouth: without lesions  Palate: Normal hard palate, soft palate and uvula  Oropharynx: Clear, no lesions present  Buccal mucosa: Normal without masses or lesions  Lips: Normal    Nasopharynx: Normal on endoscopy    Larynx and hypopharynx: Flexible laryngoscopy was performed secondary to an inadequate mirror examination.  With verbal informed consent the flexible laryngoscope was passed through the nasal cavity.  The patient had a normal epiglottis, AE folds, arytenoids, false vocal cords, true vocal cords, and normal vocal cord mobility bilaterally.  No significant mucosal masses or lesions noted    Neck:  Salivary glands: Normal bilateral parotid and submandibular glands by inspection and palpation.  Non-thyroid masses: No palpable masses or significant lymphadenopathy  Trachea: Midline  Thyroid: No thyromegaly or palpable nodules  Temporomandibular joint: Nontender  Cervical range of motion: Normal    Neurologic exam: Alert and oriented x3, appropriate affect.  Cranial nerves II-XII normal bilaterally  Extraocular movement: Extraocular movement intact, normal gaze alignment          Lauri was seen today for new patient visit.  Diagnoses and all orders for this visit:  Hoarseness of voice (Primary)  Post-nasal drip  Sensorineural hearing loss (SNHL) of both ears  Bilateral impacted cerumen  Oral thrush       PLAN  His thrush is resolved.  Vocal cords look normal in appearance and mobility.  His secretions look dry.  I have recommended increasing fluids.  Cerumen impactions were removed.  Hearing aids working well.  Recheck as needed    Jerad Nguyen MD

## 2024-01-16 ENCOUNTER — TELEPHONE (OUTPATIENT)
Dept: PRIMARY CARE | Facility: CLINIC | Age: 69
End: 2024-01-16
Payer: MEDICARE

## 2024-01-16 DIAGNOSIS — J43.9 PULMONARY EMPHYSEMA, UNSPECIFIED EMPHYSEMA TYPE (MULTI): Primary | ICD-10-CM

## 2024-01-16 NOTE — TELEPHONE ENCOUNTER
Pt states he needs to renew his Handicap Placard for one vehicle.  Can the order be mailed out to address on file, which was confirmed as correct with pt during call, when done.

## 2024-01-24 ENCOUNTER — OFFICE VISIT (OUTPATIENT)
Dept: UROLOGY | Facility: CLINIC | Age: 69
End: 2024-01-24
Payer: MEDICARE

## 2024-01-24 DIAGNOSIS — N40.1 BENIGN PROSTATIC HYPERPLASIA WITH LOWER URINARY TRACT SYMPTOMS, SYMPTOM DETAILS UNSPECIFIED: ICD-10-CM

## 2024-01-24 DIAGNOSIS — R97.20 ELEVATED PSA: ICD-10-CM

## 2024-01-24 LAB
APPEARANCE UR: CLEAR
BILIRUB UR STRIP.AUTO-MCNC: NEGATIVE MG/DL
COLOR UR: NORMAL
GLUCOSE UR STRIP.AUTO-MCNC: NEGATIVE MG/DL
KETONES UR STRIP.AUTO-MCNC: NEGATIVE MG/DL
LEUKOCYTE ESTERASE UR QL STRIP.AUTO: NEGATIVE
NITRITE UR QL STRIP.AUTO: NEGATIVE
PH UR STRIP.AUTO: 5 [PH]
PROT UR STRIP.AUTO-MCNC: NEGATIVE MG/DL
RBC # UR STRIP.AUTO: NEGATIVE /UL
SP GR UR STRIP.AUTO: 1.01
UROBILINOGEN UR STRIP.AUTO-MCNC: <2 MG/DL

## 2024-01-24 PROCEDURE — 1159F MED LIST DOCD IN RCRD: CPT | Performed by: STUDENT IN AN ORGANIZED HEALTH CARE EDUCATION/TRAINING PROGRAM

## 2024-01-24 PROCEDURE — 1160F RVW MEDS BY RX/DR IN RCRD: CPT | Performed by: STUDENT IN AN ORGANIZED HEALTH CARE EDUCATION/TRAINING PROGRAM

## 2024-01-24 PROCEDURE — 1126F AMNT PAIN NOTED NONE PRSNT: CPT | Performed by: STUDENT IN AN ORGANIZED HEALTH CARE EDUCATION/TRAINING PROGRAM

## 2024-01-24 PROCEDURE — 87086 URINE CULTURE/COLONY COUNT: CPT

## 2024-01-24 PROCEDURE — 81003 URINALYSIS AUTO W/O SCOPE: CPT

## 2024-01-24 PROCEDURE — 1036F TOBACCO NON-USER: CPT | Performed by: STUDENT IN AN ORGANIZED HEALTH CARE EDUCATION/TRAINING PROGRAM

## 2024-01-24 PROCEDURE — 99204 OFFICE O/P NEW MOD 45 MIN: CPT | Performed by: STUDENT IN AN ORGANIZED HEALTH CARE EDUCATION/TRAINING PROGRAM

## 2024-01-24 PROCEDURE — 1157F ADVNC CARE PLAN IN RCRD: CPT | Performed by: STUDENT IN AN ORGANIZED HEALTH CARE EDUCATION/TRAINING PROGRAM

## 2024-01-24 NOTE — PROGRESS NOTES
Subjective   Patient ID: Lauri Dolan is a 68 y.o. male who presents for elevated PSA and LUTS.  HPI  68 y.o. male who presents for elevated PSA and LUTS.    He has hx of non-small cell lung cancer right status post chemoradiation, he underwent wedge resection and lymph node dissection in August 2018.  Tumor was 1.3 cm lymph nodes were negative. Received maintenance Keytruda until summer 2023.      He reports he needs to push to void, has nocturia x 3. He cannot hold it when he has to void.     He was started on tamsulosin 0.4 mg, was not working too well so was increased to double dose.     Was on medication for thrush and he reports he had dark urine. He was seen by oncology who prescribed a stronger med for thrush (fluconazole), he notes this cleared his urine.     Past medical history includes hypertension coronary disease.  He recently quit smoking. HX           Component  Ref Range & Units 3 mo ago 3 yr ago 5 yr ago   Prostate Specific Antigen,Screen  <=4.00 ng/mL 4.28 High  2.23 R, CM 2.49 R, C          Review of Systems  A complete review of systems was performed. All systems are noted to be negative unless indicated in the history of present illness, impression, active problem list, or past histories.     Objective   Physical Exam  General: Well developed, well nourished, alert and cooperative, appears in no acute distress  Eyes: Non-injected conjunctiva, sclera clear, no proptosis  Cardiac: Extremities are warm and well perfused. No edema, cyanosis or pallor.   Lungs: Breathing is easy, non-labored. Speaking in clear and complete sentences. Normal diaphragmatic movement.  Abdomen: soft, non-tender  MSK: Ambulatory with steady gait, unassisted  Neuro: alert and oriented to person, place and time  Psych: Demonstrates good judgement and reason, without hallucinations, abnormal affect or abnormal behaviors.  Skin: no obvious lesions, no rashes.    Assessment/Plan   68 y.o. male who presents for elevated PSA  and LUTS.  He has hx of non-small cell lung cancer right status post chemoradiation, he underwent wedge resection and lymph node dissection in August 2018. Most recent PSA 4.2.      He reports he needs to push to void, has nocturia x 3. He cannot hold it when he has to void.     He was started on tamsulosin 0.4 mg, was not working too well so was increased to double dose.     He had an episode of dark urine which resolved after course of Diflucan prescribed for thrush.     I personally reviewed the medical records of the patient including the note of the referring physician.     We discussed cystoscopy and TRUS for prostate sizing to evaluate the prostate. We discussed the procedure at length, including risks, benefits, alternatives and side effects. He agrees to proceed. We will repeat PSA in 3 months to monitor trend.       Plan:   UA, U culture today.   Continue tamsulosin  Cystoscopy with TRUS for prostate sizing.   Repeat PSA f/t in 3 months.   Diagnoses and all orders for this visit:  Elevated PSA  -     Referral to Urology    Scribe Attestation  By signing my name below, I, Lorraine Chu attest that this documentation has been prepared under the direction and in the presence of Christine Alvarez MD.

## 2024-01-25 LAB — BACTERIA UR CULT: NORMAL

## 2024-01-31 ENCOUNTER — OFFICE VISIT (OUTPATIENT)
Dept: CARDIOLOGY | Facility: HOSPITAL | Age: 69
End: 2024-01-31
Payer: MEDICARE

## 2024-01-31 VITALS — SYSTOLIC BLOOD PRESSURE: 116 MMHG | DIASTOLIC BLOOD PRESSURE: 72 MMHG | HEART RATE: 72 BPM | OXYGEN SATURATION: 96 %

## 2024-01-31 DIAGNOSIS — I25.10 CORONARY ARTERY DISEASE INVOLVING NATIVE CORONARY ARTERY OF NATIVE HEART WITHOUT ANGINA PECTORIS: Primary | ICD-10-CM

## 2024-01-31 DIAGNOSIS — Z01.810 PREOP CARDIOVASCULAR EXAM: ICD-10-CM

## 2024-01-31 DIAGNOSIS — Z95.1 S/P CABG X 1: ICD-10-CM

## 2024-01-31 PROCEDURE — 99214 OFFICE O/P EST MOD 30 MIN: CPT | Performed by: INTERNAL MEDICINE

## 2024-01-31 PROCEDURE — 93005 ELECTROCARDIOGRAM TRACING: CPT | Performed by: INTERNAL MEDICINE

## 2024-01-31 PROCEDURE — 99214 OFFICE O/P EST MOD 30 MIN: CPT | Mod: 25 | Performed by: INTERNAL MEDICINE

## 2024-01-31 PROCEDURE — 93010 ELECTROCARDIOGRAM REPORT: CPT | Performed by: INTERNAL MEDICINE

## 2024-01-31 PROCEDURE — 1126F AMNT PAIN NOTED NONE PRSNT: CPT | Performed by: INTERNAL MEDICINE

## 2024-01-31 PROCEDURE — 1036F TOBACCO NON-USER: CPT | Performed by: INTERNAL MEDICINE

## 2024-01-31 PROCEDURE — 1159F MED LIST DOCD IN RCRD: CPT | Performed by: INTERNAL MEDICINE

## 2024-01-31 PROCEDURE — 1157F ADVNC CARE PLAN IN RCRD: CPT | Performed by: INTERNAL MEDICINE

## 2024-01-31 RX ORDER — ATORVASTATIN CALCIUM 40 MG/1
40 TABLET, FILM COATED ORAL DAILY
Qty: 90 TABLET | Refills: 3 | Status: SHIPPED | OUTPATIENT
Start: 2024-01-31 | End: 2025-01-30

## 2024-01-31 NOTE — PROGRESS NOTES
Chief Complaint:   Follow-up     History Of Present Illness:    Lauri Dolan is a 68 y.o. 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  Doing well from a cardiac standpoint.  Denies chest pain or pressure.  SOB has resolved since switching home heat from wood burning stove to propane.       Last Recorded Vitals:  Vitals:    01/31/24 1041   BP: 116/72   Pulse: 72   SpO2: 96%       Past Medical History:  He has a past medical history of Acute bronchitis (12/01/2023), Candidiasis (12/01/2023), Coronary artery disease, Cough (12/01/2023), Lung cancer (CMS/[facility]), Old myocardial infarction (03/26/2019), Personal history of other diseases of the respiratory system (05/21/2019), Personal history of other diseases of the respiratory system (11/23/2021), Personal history of other malignant neoplasm of skin, Pharyngitis (09/05/2023), and Wheezing (12/01/2023).    Past Surgical History:  He has a past surgical history that includes Hemicolectomy (06/18/2018); Hernia repair (06/18/2018); Other surgical history (06/03/2019); Other surgical history (05/18/2021); Other surgical history (04/12/2022); Other surgical history (08/31/2020); Other surgical history (08/31/2020); Other surgical history (08/31/2020); Other surgical history (04/08/2019); CT angio abdomen pelvis w and or wo IV IV contrast (3/15/2019); CT angio abdomen pelvis w and or wo IV IV contrast (6/26/2020); CT angio aorta and bilateral iliofemoral runoff w and or wo IV contrast (8/11/2020); CT angio aorta and bilateral iliofemoral runoff w and or wo IV contrast (2/10/2021); CT angio abdomen pelvis w and or wo IV IV contrast (5/13/2021); and CT angio aorta and bilateral iliofemoral runoff w and or wo IV contrast (5/19/2021).      Social History:  He reports that he quit smoking about 3 years ago. His smoking use included cigarettes. He smoked an  average of 1 pack per day. He has quit using smokeless tobacco.  His smokeless tobacco use included snuff. He reports that he does not currently use alcohol. He reports that he does not currently use drugs.    Family History:  No family history on file.     Allergies:  Azithromycin and Penicillins    Outpatient Medications:  Current Outpatient Medications   Medication Instructions    acetaminophen (Tylenol) 325 mg tablet TAKE 1 TO 2 TABLETS EVERY 6 HOURS AS NEEDED.    albuterol 90 mcg/actuation inhaler INHALE 1 TO 2 PUFFS BY MOUTH EVERY 4 TO 6 HOURS AS NEEDED    atorvastatin (LIPITOR) 40 mg, oral, Daily    clobetasol (Temovate) 0.05 % external solution APPLY TO SCALP TWICE DAILY FOR 14 DAYS AS NEEDED FOR FLARES/ITCHING.    clopidogrel (PLAVIX) 75 mg, oral, Daily    cyclobenzaprine (FLEXERIL) 10 mg, oral, 3 times daily PRN    ketoconazole (NIZOral) 2 % shampoo APPLY TO SCALP EVERY OTHER DAY FOR 5 MINUTES AND THEN RINSE OFF    mometasone (Asmanex HFA) 200 mcg/actuation HFA aerosol inhaler INHALE 1 TO 2 PUFFS TWICE DAILY    Spiriva Respimat 2.5 mcg/actuation inhaler 2 puffs, inhalation, Daily    tamsulosin (FLOMAX) 0.8 mg, oral, Nightly    tiotropium-olodateroL (Stiolto Respimat) 2.5-2.5 mcg/actuation mist inhaler Stiolto Respimat 2.5-2.5 MCG/ACT Inhalation Aerosol Solution INHALE 2 PUFFS BY MOUTH DAILY Quantity: 4 Refills: 11 Dequan Wright MD Start : 8-Jul-2019 Active    triamcinolone (Kenalog) 0.025 % cream APPLY 2-3 TIMES DAILY TO AFFECTED AREA(S).       Physical Exam:  Constitutional: Pleasant, Awake/Alert/Oriented to person place and time. No distress  Head: Atraumatic, Normocephalic  Eyes: EOMI. ZOYA  Neck: No JVD  Cardiovascular: Regular rate and rhythm, S1, S2. No extra heart sounds or murmurs  Respiratory: Clear to auscultation bilaterally. No wheezing, rales or rhonchi. Good chest wall expansion  Abdomen: Soft, Nontender. Bowel sounds appreciated  Musculoskeletal: ROM intact. Muscle strength grossly intact  upper and lower extremities 5/5.   Neurological: CNII-XII intact. Sensation grossly intact  Extremities: Warm and dry. No acute rashes and lesions  Psychiatric: Appropriate mood and affect       Last Labs:  CBC -  Lab Results   Component Value Date    WBC 4.4 11/24/2023    HGB 14.0 11/24/2023    HCT 42.5 11/24/2023    MCV 97 11/24/2023     11/24/2023       CMP -  Lab Results   Component Value Date    CALCIUM 8.3 (L) 11/24/2023    PHOS 3.6 01/22/2022    PROT 5.8 (L) 11/24/2023    ALBUMIN 4.1 11/24/2023    AST 18 11/24/2023    ALT 26 11/24/2023    ALKPHOS 57 11/24/2023    BILITOT 0.5 11/24/2023       LIPID PANEL -   Lab Results   Component Value Date    CHOL 177 10/25/2023    TRIG 179 (H) 10/25/2023    HDL 27.8 10/25/2023    CHHDL 6.4 10/25/2023    LDLF - 02/22/2019    VLDL 36 10/25/2023    NHDL 149 10/25/2023       RENAL FUNCTION PANEL -   Lab Results   Component Value Date    GLUCOSE 100 (H) 11/24/2023     11/24/2023    K 4.2 11/24/2023     11/24/2023    CO2 27 11/24/2023    ANIONGAP 10 11/24/2023    BUN 11 11/24/2023    CREATININE 0.60 11/24/2023    GFRMALE CANCELED 05/23/2023    CALCIUM 8.3 (L) 11/24/2023    PHOS 3.6 01/22/2022    ALBUMIN 4.1 11/24/2023        Lab Results   Component Value Date    BNP 41 01/21/2022    HGBA1C 5.9 04/23/2019       Last Cardiology Tests:  ECG:  NSR, HR 75bpm    Echo:  Echo 3/18/19:   1. The left ventricular systolic function is normal with a 60-65% estimated  ejection fraction.   2. Spectral Doppler shows an impaired relaxation pattern of left ventricular  diastolic filling.   3. The right ventricle is mild-moderately enlarged. There is normal right  ventricular global systolic function.   4. There is mild tricuspid regurgitation.   5. The estimated pulmonary artery pressure is mildly elevated with the RVSP at  48.1 mmHg.   6. There is a small pericardial effusion.     Cath:  Cath 3/21/2019:  1. Left main: no significant angiographic disease.  2. LAD: diffuse  proximal LAD disease with 80% disease prior to D1 bifurcation  with extreme angulation at bifurcation.  3. LCx: 60% proximal disease, 60% mid-vessel stenosis.  4. RCA: 50% angulated mid-vessel disease.  5. LVEDP 13mmHg, no aortic stenosis on LV-Ao gradient.     Stress Test:  Nuclear Stress Test     IMPRESSION:  1. Negative myocardial perfusion study without evidence of inducible  myocardial ischemia or prior infarction.  2. The left ventricle is normal in size.  3. Normal LV wall motion with a post-stress LV EF estimated at 57%.  4. Small right pleural effusion.      Lab review: I have personally reviewed the laboratory result(s)   Diagnostic review: I have personally reviewed the result(s) of the Echocardiogram and [facility]    Assessment/Plan   68 y.o. 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 Per patient, is scheduled for uroscopy d/t elevated PSA.     Plan:  - Continue plavix and resume atorvastatin 40mg daily  - Patient is low risk for perioperative cardiovascular event.  Recommend holding plavix for 5 days prior to uroscopy with plan to resume when safe post-procedure from a Urology standpoint.   -Follow up in 1 year or sooner if needed       CHRISTIANO Parham-CNP  Manuel lPatt MD

## 2024-02-17 LAB
ATRIAL RATE: 75 BPM
P AXIS: 74 DEGREES
P OFFSET: 180 MS
P ONSET: 133 MS
PR INTERVAL: 178 MS
Q ONSET: 222 MS
QRS COUNT: 12 BEATS
QRS DURATION: 90 MS
QT INTERVAL: 386 MS
QTC CALCULATION(BAZETT): 431 MS
QTC FREDERICIA: 415 MS
R AXIS: 67 DEGREES
T AXIS: 48 DEGREES
T OFFSET: 415 MS
VENTRICULAR RATE: 75 BPM

## 2024-02-22 DIAGNOSIS — I25.10 CORONARY ARTERY DISEASE INVOLVING NATIVE CORONARY ARTERY OF NATIVE HEART WITHOUT ANGINA PECTORIS: Primary | ICD-10-CM

## 2024-02-22 PROBLEM — E03.9 HYPOTHYROIDISM: Status: ACTIVE | Noted: 2023-02-07

## 2024-02-22 PROBLEM — H90.3 SENSORINEURAL HEARING LOSS (SNHL) OF BOTH EARS: Status: ACTIVE | Noted: 2024-02-22

## 2024-02-22 PROBLEM — R97.20 HIGH PROSTATE SPECIFIC ANTIGEN (PSA): Status: ACTIVE | Noted: 2023-10-25

## 2024-02-22 PROBLEM — H61.23 BILATERAL IMPACTED CERUMEN: Status: RESOLVED | Noted: 2024-02-22 | Resolved: 2024-02-22

## 2024-02-22 PROBLEM — R49.0 HOARSENESS: Status: ACTIVE | Noted: 2024-02-22

## 2024-02-22 RX ORDER — CLOPIDOGREL BISULFATE 75 MG/1
75 TABLET ORAL DAILY
Qty: 90 TABLET | Refills: 2 | Status: SHIPPED | OUTPATIENT
Start: 2024-02-22

## 2024-02-28 ENCOUNTER — PROCEDURE VISIT (OUTPATIENT)
Dept: UROLOGY | Facility: CLINIC | Age: 69
End: 2024-02-28
Payer: MEDICARE

## 2024-02-28 DIAGNOSIS — R35.0 BENIGN PROSTATIC HYPERPLASIA WITH URINARY FREQUENCY: ICD-10-CM

## 2024-02-28 DIAGNOSIS — N40.1 BENIGN PROSTATIC HYPERPLASIA WITH URINARY FREQUENCY: ICD-10-CM

## 2024-02-28 DIAGNOSIS — R30.0 DYSURIA: Primary | ICD-10-CM

## 2024-02-28 LAB
POC APPEARANCE, URINE: CLEAR
POC BILIRUBIN, URINE: NEGATIVE
POC BLOOD, URINE: NEGATIVE
POC COLOR, URINE: YELLOW
POC GLUCOSE, URINE: NEGATIVE MG/DL
POC KETONES, URINE: NEGATIVE MG/DL
POC LEUKOCYTES, URINE: NEGATIVE
POC NITRITE,URINE: NEGATIVE
POC PH, URINE: 6.5 PH
POC PROTEIN, URINE: NEGATIVE MG/DL
POC SPECIFIC GRAVITY, URINE: 1.02
POC UROBILINOGEN, URINE: 0.2 EU/DL

## 2024-02-28 PROCEDURE — 52000 CYSTOURETHROSCOPY: CPT | Performed by: STUDENT IN AN ORGANIZED HEALTH CARE EDUCATION/TRAINING PROGRAM

## 2024-02-28 PROCEDURE — 99214 OFFICE O/P EST MOD 30 MIN: CPT | Performed by: STUDENT IN AN ORGANIZED HEALTH CARE EDUCATION/TRAINING PROGRAM

## 2024-02-28 PROCEDURE — 76872 US TRANSRECTAL: CPT | Performed by: STUDENT IN AN ORGANIZED HEALTH CARE EDUCATION/TRAINING PROGRAM

## 2024-02-28 RX ORDER — TAMSULOSIN HYDROCHLORIDE 0.4 MG/1
0.8 CAPSULE ORAL NIGHTLY
Qty: 60 CAPSULE | Refills: 11 | Status: SHIPPED | OUTPATIENT
Start: 2024-02-28 | End: 2025-02-27

## 2024-02-28 NOTE — PROGRESS NOTES
Patient ID: Lauri Dolan is a 68 y.o. male.    Procedures    PROCEDURE NOTE:    PREOPERATIVE DIAGNOSIS:  BPH and dysuria    POSTOPERATIVE DIAGNOSIS:  Same    OPERATION:  Flexible Cystourethroscopy      SURGEON:  Christine Alvarez MD    ANESTHESIA:  2%  lidocaine jelly    COMPLICATIONS:  None    EBL: Minimal    SPECIMEN:  Voided urine was not collected and submitted for cytology.    DISPOSITION:  The patient was discharged home after the procedure, per routine.    INDICATIONS: :  Mr. Dolan is a 68 y.o. patient with a history of LUTS including dysuria who presents today for Cystoscopy.     The indications, risks and benefits of this procedure were discussed with the patient, consent was obtained prior to the procedure, and to the best of my judgement the patient seemed to understand and agree to the procedure.    PROCEDURE:  The patient  was brought into the procedure suite and informed consent was reviewed and confirmed. Vital signs were obtained prior to the procedure: There were no vitals taken for this visit..  The patient was escorted onto the stretcher, placed supine, prepped with betadine and draped in the usual standard surgical fashion.  Intraurethral 2% viscous lidocaine jelly was used for local analgesia.  A 16 Tajik flexible cystourethroscope was inserted into the urethra.   The penile urethra was normal.  The prostate urethra was mildly obstructed with a severely erythematous median lobe with moderate protrusion into the bladder neck.  Upon entering the bladder the entire bladder was surveyed in a 360 degree fashion.  The left and right ureteral orifices were in normal orthotopic position effluxing clear yellow urine, bilaterally.   There was no evidence of any bladder lesions, foreign objects, stones or evidence of any mucosal changes. The cystoscope was then retroflexed.  The bladder neck was then further examined without any evidence of lesions. The scope was then removed and in an antegrade  fashion, the urethra and bladder were again resurveyed with no evidence of additional lesions.  The cystoscope was then fully removed.   The patient tolerated the procedure well.  Vitals were stable after the procedure.  The patient was able to void and was discharged home.  Verbal and written Post procedure instructions were reviewed with the patient.    IMPRESSION:  Enlarged obstructing prostate with prominent intravesical median lobe    PLAN:  TURP vs continue medical therapy, patient elected to defer surgery    TRUS sizing of prostate:  W: 55.14mm  H: 30.18mm  L: 46.63mm    V= 40.58cc      Subjective   Patient ID: Lauri Dolan is a 68 y.o. male.    HPI  68 y.o. male who presents for elevated PSA and LUTS.     He has hx of non-small cell lung cancer right status post chemoradiation, he underwent wedge resection and lymph node dissection in August 2018.  Tumor was 1.3 cm lymph nodes were negative. Received maintenance Keytruda until summer 2023.      He reports he needs to push to void, has nocturia x 3. He cannot hold it when he has to void.      He was started on tamsulosin 0.4 mg, was not working too well so was increased to double dose.      Was on medication for thrush and he reports he had dark urine. He was seen by oncology who prescribed a stronger med for thrush (fluconazole), he notes this cleared his urine.      Past medical history includes hypertension coronary disease.  He recently quit smoking.               Component  Ref Range & Units 3 mo ago 3 yr ago 5 yr ago   Prostate Specific Antigen,Screen  <=4.00 ng/mL 4.28 High  2.23 R, CM 2.49 R, C        Review of Systems    Objective   Physical Exam  Vitals reviewed.         Assessment/Plan   68 y.o. male who presents for elevated PSA and LUTS.  He has hx of non-small cell lung cancer right status post chemoradiation, he underwent wedge resection and lymph node dissection in August 2018. Most recent PSA 4.2.      He reports he needs to push to void,  has nocturia x 3. He cannot hold it when he has to void.      He was started on tamsulosin 0.4 mg, was not working too well so was increased to double dose.      He had an episode of dark urine which resolved after course of Diflucan prescribed for thrush. Resolved.     Cystoscopy findings as above.     I discussed with him at length his options including continuing the medical therapy or considering a TURP for his refractory dysuria. I explained the surgery, the expected recovery, the risks and benefits. He believes he is not ready yet for surgery and would like to continue tamsulosin double dose.    Diagnoses and all orders for this visit:  Benign prostatic hyperplasia with urinary frequency  Continue tamsulosin 0.8mg daily at bedtime  FU in 6 months

## 2024-03-11 ENCOUNTER — HOSPITAL ENCOUNTER (OUTPATIENT)
Dept: VASCULAR MEDICINE | Facility: HOSPITAL | Age: 69
Discharge: HOME | End: 2024-03-11
Payer: MEDICARE

## 2024-03-11 DIAGNOSIS — I73.9 PERIPHERAL VASCULAR DISEASE, UNSPECIFIED (CMS-HCC): ICD-10-CM

## 2024-03-11 PROCEDURE — 93922 UPR/L XTREMITY ART 2 LEVELS: CPT

## 2024-03-11 PROCEDURE — 93922 UPR/L XTREMITY ART 2 LEVELS: CPT | Mod: REDUCED SERVICES | Performed by: SURGERY

## 2024-04-11 ENCOUNTER — LAB (OUTPATIENT)
Dept: LAB | Facility: LAB | Age: 69
End: 2024-04-11
Payer: MEDICARE

## 2024-04-11 DIAGNOSIS — R97.20 ELEVATED PSA: ICD-10-CM

## 2024-04-11 PROCEDURE — 84153 ASSAY OF PSA TOTAL: CPT

## 2024-04-11 PROCEDURE — 36415 COLL VENOUS BLD VENIPUNCTURE: CPT

## 2024-04-11 PROCEDURE — 84154 ASSAY OF PSA FREE: CPT

## 2024-04-13 LAB
PSA FREE MFR SERPL: 8 %
PSA FREE SERPL-MCNC: 0.6 NG/ML
PSA SERPL IA-MCNC: 8 NG/ML (ref 0–4)

## 2024-05-20 ENCOUNTER — APPOINTMENT (OUTPATIENT)
Dept: PULMONOLOGY | Facility: CLINIC | Age: 69
End: 2024-05-20
Payer: MEDICARE

## 2024-05-24 ENCOUNTER — OFFICE VISIT (OUTPATIENT)
Dept: PULMONOLOGY | Facility: CLINIC | Age: 69
End: 2024-05-24
Payer: MEDICARE

## 2024-05-24 VITALS
OXYGEN SATURATION: 96 % | RESPIRATION RATE: 16 BRPM | HEART RATE: 64 BPM | SYSTOLIC BLOOD PRESSURE: 128 MMHG | DIASTOLIC BLOOD PRESSURE: 71 MMHG

## 2024-05-24 DIAGNOSIS — C34.91 ADENOCARCINOMA OF RIGHT LUNG (MULTI): ICD-10-CM

## 2024-05-24 DIAGNOSIS — J43.9 PULMONARY EMPHYSEMA, UNSPECIFIED EMPHYSEMA TYPE (MULTI): Primary | ICD-10-CM

## 2024-05-24 PROCEDURE — 1126F AMNT PAIN NOTED NONE PRSNT: CPT | Performed by: INTERNAL MEDICINE

## 2024-05-24 PROCEDURE — 1160F RVW MEDS BY RX/DR IN RCRD: CPT | Performed by: INTERNAL MEDICINE

## 2024-05-24 PROCEDURE — 1036F TOBACCO NON-USER: CPT | Performed by: INTERNAL MEDICINE

## 2024-05-24 PROCEDURE — 99213 OFFICE O/P EST LOW 20 MIN: CPT | Performed by: INTERNAL MEDICINE

## 2024-05-24 PROCEDURE — 1157F ADVNC CARE PLAN IN RCRD: CPT | Performed by: INTERNAL MEDICINE

## 2024-05-24 PROCEDURE — 1159F MED LIST DOCD IN RCRD: CPT | Performed by: INTERNAL MEDICINE

## 2024-05-24 ASSESSMENT — PATIENT HEALTH QUESTIONNAIRE - PHQ9
2. FEELING DOWN, DEPRESSED OR HOPELESS: NOT AT ALL
1. LITTLE INTEREST OR PLEASURE IN DOING THINGS: NOT AT ALL
SUM OF ALL RESPONSES TO PHQ9 QUESTIONS 1 AND 2: 0

## 2024-05-24 ASSESSMENT — ENCOUNTER SYMPTOMS
NEUROLOGICAL NEGATIVE: 1
DEPRESSION: 0
CHILLS: 0
RESPIRATORY NEGATIVE: 1
PSYCHIATRIC NEGATIVE: 1
FEVER: 0
CARDIOVASCULAR NEGATIVE: 1
COUGH: 0
GASTROINTESTINAL NEGATIVE: 1

## 2024-05-24 ASSESSMENT — COLUMBIA-SUICIDE SEVERITY RATING SCALE - C-SSRS
2. HAVE YOU ACTUALLY HAD ANY THOUGHTS OF KILLING YOURSELF?: NO
1. IN THE PAST MONTH, HAVE YOU WISHED YOU WERE DEAD OR WISHED YOU COULD GO TO SLEEP AND NOT WAKE UP?: NO
6. HAVE YOU EVER DONE ANYTHING, STARTED TO DO ANYTHING, OR PREPARED TO DO ANYTHING TO END YOUR LIFE?: NO

## 2024-05-24 ASSESSMENT — PAIN SCALES - GENERAL: PAINLEVEL: 0-NO PAIN

## 2024-05-24 NOTE — ASSESSMENT & PLAN NOTE
mild obstruction 4/2022. +IgE. Cont Stiolto. Pt declined MA previously. Cont Proair.  Repeat PFTs.

## 2024-05-24 NOTE — PROGRESS NOTES
Subjective   Patient ID: Lauri Dolan is a 69 y.o. male who presents for Lung Eval.  h/o CAD, s/p CABG, lung Ca, COPD here for f/u of COPD. mild obstruction (4/2022) . +IgE. Thinks breathing is worse when he's working in his yard due to pine trees.  No fevers, chills, or cough.  Currently on Stiolto.  Uses rescue 1x/month.  ET is about 50 feet.        Review of Systems   Constitutional:  Negative for chills and fever.   Respiratory: Negative.  Negative for cough.    Cardiovascular: Negative.    Gastrointestinal: Negative.    Skin:  Negative for rash.   Neurological: Negative.    Psychiatric/Behavioral: Negative.     All other systems reviewed and are negative.      Objective   Physical Exam  Vitals reviewed.   Constitutional:       Appearance: Normal appearance.   HENT:      Head: Normocephalic and atraumatic.   Eyes:      Extraocular Movements: Extraocular movements intact.   Cardiovascular:      Rate and Rhythm: Normal rate and regular rhythm.      Heart sounds: Normal heart sounds.   Pulmonary:      Effort: Pulmonary effort is normal.      Breath sounds: Normal breath sounds.   Abdominal:      Palpations: Abdomen is soft.      Tenderness: There is no abdominal tenderness.   Musculoskeletal:      Cervical back: Normal range of motion.   Skin:     General: Skin is warm.   Neurological:      General: No focal deficit present.      Mental Status: He is alert and oriented to person, place, and time. Mental status is at baseline.   Psychiatric:         Mood and Affect: Mood normal.         Behavior: Behavior normal.         Assessment/Plan   Problem List Items Addressed This Visit       Adenocarcinoma of right lung (Multi)     s/p resection - followed by oncology            COPD (chronic obstructive pulmonary disease) (Multi) - Primary     mild obstruction 4/2022. +IgE. Cont Stiolto. Pt declined MO previously. Cont Proair.  Repeat PFTs.         Relevant Orders    Complete Pulmonary Function Test Pre/Post  Bronchodialator (Spirometry Pre/Post/DLCO/Lung Volumes)     RTC in 1 year     Time Spent  Prep time on day of patient encounter: 5 minutes  Time spent directly with patient, family or caregiver: 10 minutes  Additional Time Spent on Patient Care Activities: 0 minutes  Documentation Time: 5 minutes  Other Time Spent: 0 minutes  Total: 20 minutes        Dequan Wright MD 05/24/24 12:36 PM

## 2024-05-28 ENCOUNTER — HOSPITAL ENCOUNTER (OUTPATIENT)
Dept: RADIOLOGY | Facility: HOSPITAL | Age: 69
Discharge: HOME | End: 2024-05-28
Payer: MEDICARE

## 2024-05-28 DIAGNOSIS — C34.31 MALIGNANT NEOPLASM OF LOWER LOBE, RIGHT BRONCHUS OR LUNG (MULTI): ICD-10-CM

## 2024-05-28 DIAGNOSIS — C34.90 MALIGNANT NEOPLASM OF UNSPECIFIED PART OF UNSPECIFIED BRONCHUS OR LUNG (MULTI): ICD-10-CM

## 2024-05-28 DIAGNOSIS — B37.0 THRUSH: ICD-10-CM

## 2024-05-28 DIAGNOSIS — E03.9 HYPOTHYROIDISM, UNSPECIFIED: ICD-10-CM

## 2024-05-28 LAB
CREAT SERPL-MCNC: 0.59 MG/DL (ref 0.6–1.3)
GFR SERPL CREATININE-BSD FRML MDRD: >90 ML/MIN/1.73M*2

## 2024-05-28 PROCEDURE — 82565 ASSAY OF CREATININE: CPT

## 2024-05-28 PROCEDURE — 71260 CT THORAX DX C+: CPT

## 2024-05-28 PROCEDURE — 2550000001 HC RX 255 CONTRASTS: Performed by: INTERNAL MEDICINE

## 2024-05-28 PROCEDURE — 71260 CT THORAX DX C+: CPT | Performed by: RADIOLOGY

## 2024-05-28 RX ADMIN — IOHEXOL 50 ML: 350 INJECTION, SOLUTION INTRAVENOUS at 13:23

## 2024-06-04 ENCOUNTER — OFFICE VISIT (OUTPATIENT)
Dept: HEMATOLOGY/ONCOLOGY | Facility: CLINIC | Age: 69
End: 2024-06-04
Payer: MEDICARE

## 2024-06-04 VITALS
RESPIRATION RATE: 16 BRPM | WEIGHT: 173.94 LBS | BODY MASS INDEX: 25.69 KG/M2 | DIASTOLIC BLOOD PRESSURE: 71 MMHG | OXYGEN SATURATION: 97 % | SYSTOLIC BLOOD PRESSURE: 112 MMHG | HEART RATE: 78 BPM | TEMPERATURE: 97.5 F

## 2024-06-04 DIAGNOSIS — E03.9 HYPOTHYROIDISM, UNSPECIFIED: ICD-10-CM

## 2024-06-04 DIAGNOSIS — B37.0 THRUSH: ICD-10-CM

## 2024-06-04 DIAGNOSIS — C34.90 MALIGNANT NEOPLASM OF UNSPECIFIED PART OF UNSPECIFIED BRONCHUS OR LUNG (MULTI): ICD-10-CM

## 2024-06-04 DIAGNOSIS — C34.31 MALIGNANT NEOPLASM OF LOWER LOBE, RIGHT BRONCHUS OR LUNG (MULTI): ICD-10-CM

## 2024-06-04 PROCEDURE — 1159F MED LIST DOCD IN RCRD: CPT | Performed by: INTERNAL MEDICINE

## 2024-06-04 PROCEDURE — 1157F ADVNC CARE PLAN IN RCRD: CPT | Performed by: INTERNAL MEDICINE

## 2024-06-04 PROCEDURE — 99214 OFFICE O/P EST MOD 30 MIN: CPT | Performed by: INTERNAL MEDICINE

## 2024-06-04 PROCEDURE — 1125F AMNT PAIN NOTED PAIN PRSNT: CPT | Performed by: INTERNAL MEDICINE

## 2024-06-04 ASSESSMENT — ENCOUNTER SYMPTOMS
RESPIRATORY NEGATIVE: 1
CONSTITUTIONAL NEGATIVE: 1
CARDIOVASCULAR NEGATIVE: 1
EYES NEGATIVE: 1

## 2024-06-04 ASSESSMENT — PAIN SCALES - GENERAL: PAINLEVEL: 2

## 2024-06-04 NOTE — PATIENT INSTRUCTIONS
Today you met with Dr. Mtz and your recent CT was reviewed.   He will see you back in 1 year after your next CT.  Please call the office for any questions or concerns.  Review your schedule prior to leaving [facility].  We ask that you notify us 5-7 days before your medications need refilled.  [facility] is strongly encouraging all patients to access their MyChart for all results and to communicate with their provider for non-urgent messages.  If an urgent/same day/sick concern response is needed, please call the office at [phone]. Thank You!

## 2024-06-04 NOTE — PROGRESS NOTES
Patient ID: Lauri Zuniga is a 69 y.o. male.  Referring Physician: Heather Mtz MD  [id number] [id number]  Primary Care Provider: Liz Fraser MD  Visit Type:  Follow Up     Subjective    HPI  Treatment Synopsis:    He underwent wedge resection and lymph node dissection in August 2018.  Tumor was 1.3 cm lymph nodes were negative. Treated with Neoadjuvant chemotherapy followed by Radiation; Placed on maintenance Keytruda for 1 year.     Past medical history includes hypertension coronary disease.  He recently quit smoking.     ID Statement:    LAURI ZUNIGA is a 69 year old Male        Interval History:    Patient presents for follow treatment with his wife. DCed maintenance Keytruda, approximately 2 years reports no worsening in  muscle and joint pain, since being off Keytruda.  Has dry irritated eyes, otherwise doing well. Appetite and energy good.   Review of Systems   Constitutional: Negative.    HENT:  Negative.     Eyes: Negative.    Respiratory: Negative.     Cardiovascular: Negative.         Objective   BSA: 1.96 meters squared  /71 (BP Location: Left arm)   Pulse 78   Temp 36.4 °C (97.5 °F)   Resp 16   Wt 78.9 kg (173 lb 15.1 oz)   SpO2 97%   BMI 25.69 kg/m²      has a past medical history of Acute bronchitis (12/01/2023), Bilateral impacted cerumen (02/22/2024), Candidiasis (12/01/2023), Coronary artery disease, Cough (12/01/2023), Lung cancer (Multi), Old myocardial infarction (03/26/2019), Personal history of other diseases of the respiratory system (05/21/2019), Personal history of other diseases of the respiratory system (11/23/2021), Personal history of other malignant neoplasm of skin, Pharyngitis (09/05/2023), and Wheezing (12/01/2023).   has a past surgical history that includes Hemicolectomy (06/18/2018); Hernia repair (06/18/2018); Other surgical history (06/03/2019); Other surgical history (05/18/2021); Other surgical history  (04/12/2022); Other surgical history (08/31/2020); Other surgical history (08/31/2020); Other surgical history (08/31/2020); Other surgical history (04/08/2019); CT angio abdomen pelvis w and or wo IV IV contrast (3/15/2019); CT angio abdomen pelvis w and or wo IV IV contrast (6/26/2020); CT angio aorta and bilateral iliofemoral runoff w and or wo IV contrast (8/11/2020); CT angio aorta and bilateral iliofemoral runoff w and or wo IV contrast (2/10/2021); CT angio abdomen pelvis w and or wo IV IV contrast (5/13/2021); and CT angio aorta and bilateral iliofemoral runoff w and or wo IV contrast (5/19/2021).  No family history on file.  Oncology History    No history exists.       Lauri Dolan  reports that he quit smoking about 3 years ago. His smoking use included cigarettes. He has quit using smokeless tobacco.  His smokeless tobacco use included snuff.  He  reports that he does not currently use alcohol.  He  reports that he does not currently use drugs.    Physical Exam  Constitutional:       Appearance: Normal appearance.   HENT:      Head: Normocephalic and atraumatic.   Musculoskeletal:      Comments: AKA: right LE.   Neurological:      Mental Status: He is alert.         WBC   Date/Time Value Ref Range Status   11/24/2023 11:40 AM 4.4 4.4 - 11.3 x10*3/uL Final   05/23/2023 09:40 AM 4.2 (L) 4.4 - 11.3 x10E9/L Final   05/02/2023 10:30 AM 4.9 4.4 - 11.3 x10E9/L Final   04/11/2023 10:44 AM 4.1 (L) 4.4 - 11.3 x10E9/L Final     nRBC   Date Value Ref Range Status   05/14/2019 0.0 0.0 - 0.0 /100 WBC Final   05/13/2019 0.0 0.0 - 0.0 /100 WBC Final   05/12/2019 0.0 0.0 - 0.0 /100 WBC Final     RBC   Date Value Ref Range Status   11/24/2023 4.37 (L) 4.50 - 5.90 x10*6/uL Final   05/23/2023 4.65 4.50 - 5.90 x10E12/L Final   05/02/2023 4.36 (L) 4.50 - 5.90 x10E12/L Final   04/11/2023 4.20 (L) 4.50 - 5.90 x10E12/L Final     Hemoglobin   Date Value Ref Range Status   11/24/2023 14.0 13.5 - 17.5 g/dL Final   05/23/2023 14.9  "13.5 - 17.5 g/dL Final   05/02/2023 14.1 13.5 - 17.5 g/dL Final   04/11/2023 13.9 13.5 - 17.5 g/dL Final     Hematocrit   Date Value Ref Range Status   11/24/2023 42.5 41.0 - 52.0 % Final   05/23/2023 46.3 41.0 - 52.0 % Final   05/02/2023 42.4 41.0 - 52.0 % Final   04/11/2023 41.0 41.0 - 52.0 % Final     MCV   Date/Time Value Ref Range Status   11/24/2023 11:40 AM 97 80 - 100 fL Final   05/23/2023 09:40  80 - 100 fL Final   05/02/2023 10:30 AM 97 80 - 100 fL Final   04/11/2023 10:44 AM 98 80 - 100 fL Final     MCH   Date/Time Value Ref Range Status   11/24/2023 11:40 AM 32.0 26.0 - 34.0 pg Final     MCHC   Date/Time Value Ref Range Status   11/24/2023 11:40 AM 32.9 32.0 - 36.0 g/dL Final   05/23/2023 09:40 AM 32.2 32.0 - 36.0 g/dL Final   05/02/2023 10:30 AM 33.3 32.0 - 36.0 g/dL Final   04/11/2023 10:44 AM 33.9 32.0 - 36.0 g/dL Final     RDW   Date/Time Value Ref Range Status   11/24/2023 11:40 AM 12.6 11.5 - 14.5 % Final   05/23/2023 09:40 AM 12.5 11.5 - 14.5 % Final   05/02/2023 10:30 AM 12.5 11.5 - 14.5 % Final   04/11/2023 10:44 AM 12.9 11.5 - 14.5 % Final     Platelets   Date/Time Value Ref Range Status   11/24/2023 11:40  150 - 450 x10*3/uL Final   05/23/2023 09:40  150 - 450 x10E9/L Final   05/02/2023 10:30  150 - 450 x10E9/L Final   04/11/2023 10:44  150 - 450 x10E9/L Final     No results found for: \"MPV\"  Neutrophils %   Date/Time Value Ref Range Status   11/24/2023 11:40 AM 70.7 40.0 - 80.0 % Final   05/23/2023 09:40 AM 66.2 40.0 - 80.0 % Final   05/02/2023 10:30 AM 65.6 40.0 - 80.0 % Final   04/11/2023 10:44 AM 66.1 40.0 - 80.0 % Final     Immature Granulocytes %, Automated   Date/Time Value Ref Range Status   11/24/2023 11:40 AM 0.2 0.0 - 0.9 % Final     Comment:     Immature Granulocyte Count (IG) includes promyelocytes, myelocytes and metamyelocytes but does not include bands. Percent differential counts (%) should be interpreted in the context of the absolute cell counts " (cells/UL).   05/23/2023 09:40 AM 0.0 0.0 - 0.9 % Final     Comment:      Immature Granulocyte Count (IG) includes promyelocytes,    myelocytes and metamyelocytes but does not include bands.   Percent differential counts (%) should be interpreted in the   context of the absolute cell counts (cells/L).     05/02/2023 10:30 AM 0.2 0.0 - 0.9 % Final     Comment:      Immature Granulocyte Count (IG) includes promyelocytes,    myelocytes and metamyelocytes but does not include bands.   Percent differential counts (%) should be interpreted in the   context of the absolute cell counts (cells/L).     04/11/2023 10:44 AM 0.0 0.0 - 0.9 % Final     Comment:      Immature Granulocyte Count (IG) includes promyelocytes,    myelocytes and metamyelocytes but does not include bands.   Percent differential counts (%) should be interpreted in the   context of the absolute cell counts (cells/L).       Lymphocytes %   Date/Time Value Ref Range Status   11/24/2023 11:40 AM 16.8 13.0 - 44.0 % Final   05/23/2023 09:40 AM 15.4 13.0 - 44.0 % Final     Comment:      Percent differential counts (%) should be interpreted in the   context of the absolute cell counts (cells/L).     05/02/2023 10:30 AM 16.8 13.0 - 44.0 % Final     Comment:      Percent differential counts (%) should be interpreted in the   context of the absolute cell counts (cells/L).     04/11/2023 10:44 AM 17.1 13.0 - 44.0 % Final     Comment:      Percent differential counts (%) should be interpreted in the   context of the absolute cell counts (cells/L).       Monocytes %   Date/Time Value Ref Range Status   11/24/2023 11:40 AM 8.6 2.0 - 10.0 % Final   05/23/2023 09:40 AM 10.5 2.0 - 10.0 % Final   05/02/2023 10:30 AM 11.5 2.0 - 10.0 % Final   04/11/2023 10:44 AM 11.2 2.0 - 10.0 % Final     Eosinophils %   Date/Time Value Ref Range Status   11/24/2023 11:40 AM 3.2 0.0 - 6.0 % Final   05/23/2023 09:40 AM 7.4 0.0 - 6.0 % Final   05/02/2023 10:30 AM 5.3 0.0 - 6.0 % Final    04/11/2023 10:44 AM 5.1 0.0 - 6.0 % Final     Basophils %   Date/Time Value Ref Range Status   11/24/2023 11:40 AM 0.5 0.0 - 2.0 % Final   05/23/2023 09:40 AM 0.5 0.0 - 2.0 % Final   05/02/2023 10:30 AM 0.6 0.0 - 2.0 % Final   04/11/2023 10:44 AM 0.5 0.0 - 2.0 % Final     Neutrophils Absolute   Date/Time Value Ref Range Status   11/24/2023 11:40 AM 3.12 1.20 - 7.70 x10*3/uL Final     Comment:     Percent differential counts (%) should be interpreted in the context of the absolute cell counts (cells/uL).   05/23/2023 09:40 AM 2.78 1.20 - 7.70 x10E9/L Final   05/02/2023 10:30 AM 3.21 1.20 - 7.70 x10E9/L Final   04/11/2023 10:44 AM 2.71 1.20 - 7.70 x10E9/L Final     Immature Granulocytes Absolute, Automated   Date/Time Value Ref Range Status   11/24/2023 11:40 AM 0.01 0.00 - 0.70 x10*3/uL Final     Lymphocytes Absolute   Date/Time Value Ref Range Status   11/24/2023 11:40 AM 0.74 (L) 1.20 - 4.80 x10*3/uL Final   05/23/2023 09:40 AM 0.65 (L) 1.20 - 4.80 x10E9/L Final   05/02/2023 10:30 AM 0.82 (L) 1.20 - 4.80 x10E9/L Final   04/11/2023 10:44 AM 0.70 (L) 1.20 - 4.80 x10E9/L Final     Monocytes Absolute   Date/Time Value Ref Range Status   11/24/2023 11:40 AM 0.38 0.10 - 1.00 x10*3/uL Final   05/23/2023 09:40 AM 0.44 0.10 - 1.00 x10E9/L Final   05/02/2023 10:30 AM 0.56 0.10 - 1.00 x10E9/L Final   04/11/2023 10:44 AM 0.46 0.10 - 1.00 x10E9/L Final     Eosinophils Absolute   Date/Time Value Ref Range Status   11/24/2023 11:40 AM 0.14 0.00 - 0.70 x10*3/uL Final   05/23/2023 09:40 AM 0.31 0.00 - 0.70 x10E9/L Final   05/02/2023 10:30 AM 0.26 0.00 - 0.70 x10E9/L Final   04/11/2023 10:44 AM 0.21 0.00 - 0.70 x10E9/L Final     Basophils Absolute   Date/Time Value Ref Range Status   11/24/2023 11:40 AM 0.02 0.00 - 0.10 x10*3/uL Final   05/23/2023 09:40 AM 0.02 0.00 - 0.10 x10E9/L Final   05/02/2023 10:30 AM 0.03 0.00 - 0.10 x10E9/L Final   04/11/2023 10:44 AM 0.02 0.00 - 0.10 x10E9/L Final       No components found for:  "\"PT\"  aPTT   Date/Time Value Ref Range Status   02/10/2021 04:40 PM 21 (L) 25 - 35 sec Final     Comment:       THE APTT IS NO LONGER USED FOR MONITORING     UNFRACTIONATED HEPARIN THERAPY.    FOR MONITORING HEPARIN THERAPY,     USE THE HEPARIN ASSAY.     08/14/2020 08:12 AM 21 (L) 25 - 35 sec Final     Comment:      Note new reference range as of 05/26/2020.    THE APTT IS NO LONGER USED FOR MONITORING     UNFRACTIONATED HEPARIN THERAPY.    FOR MONITORING HEPARIN THERAPY,     USE THE HEPARIN ASSAY.     08/11/2020 03:30 PM 30 25 - 35 sec Final     Comment:      Note new reference range as of 05/26/2020.    THE APTT IS NO LONGER USED FOR MONITORING     UNFRACTIONATED HEPARIN THERAPY.    FOR MONITORING HEPARIN THERAPY,     USE THE HEPARIN ASSAY.       IMPRESSION:  1. History of non-small cell lung carcinoma. Status post  chemoradiation. Patient appears to have had a wedge resection in the  right lower lobe.  2. Stable scarring right lower lobe.  3. Post radiation scarring right hilum and right lower lobe.  4. There is no evidence for recurrent tumor.  5. Underlying emphysematous changes of the lungs.      MACRO:  None      Signed by: Estefany Alegria 5/29/2024  Assessment/Plan      TRACY recurrence:    Recent history of non-small cell lung cancer right status post chemoradiation.  No evidence of disease recurrence or persistence.  Will continue surveillance  Diagnoses and all orders for this visit:  Malignant neoplasm of lower lobe, right bronchus or lung (Multi)  -     Clinic Appointment Request Follow up  -     CT chest w IV contrast; Future  Malignant neoplasm of unspecified part of unspecified bronchus or lung (Multi)  -     Clinic Appointment Request Follow up  -     CT chest w IV contrast; Future  Hypothyroidism, unspecified  -     Clinic Appointment Request Follow up  -     CT chest w IV contrast; Future  [place]  -     Clinic Appointment Request Follow up  -     CT chest w IV contrast; Future           Aman-Tutu " MD Bibi

## 2024-07-01 ENCOUNTER — TELEPHONE (OUTPATIENT)
Dept: CARDIOLOGY | Facility: HOSPITAL | Age: 69
End: 2024-07-01
Payer: MEDICARE

## 2024-07-15 ENCOUNTER — HOSPITAL ENCOUNTER (OUTPATIENT)
Dept: RADIOLOGY | Facility: CLINIC | Age: 69
Discharge: HOME | End: 2024-07-15
Payer: MEDICARE

## 2024-07-15 ENCOUNTER — LAB (OUTPATIENT)
Dept: LAB | Facility: LAB | Age: 69
End: 2024-07-15
Payer: MEDICARE

## 2024-07-15 ENCOUNTER — APPOINTMENT (OUTPATIENT)
Dept: PRIMARY CARE | Facility: CLINIC | Age: 69
End: 2024-07-15
Payer: MEDICARE

## 2024-07-15 VITALS
HEIGHT: 69 IN | SYSTOLIC BLOOD PRESSURE: 117 MMHG | OXYGEN SATURATION: 97 % | BODY MASS INDEX: 25.03 KG/M2 | HEART RATE: 82 BPM | WEIGHT: 169 LBS | DIASTOLIC BLOOD PRESSURE: 78 MMHG | TEMPERATURE: 98.4 F

## 2024-07-15 DIAGNOSIS — S78.111A UNILATERAL AKA, RIGHT (MULTI): ICD-10-CM

## 2024-07-15 DIAGNOSIS — C34.31 MALIGNANT NEOPLASM OF LOWER LOBE OF RIGHT LUNG (MULTI): ICD-10-CM

## 2024-07-15 DIAGNOSIS — J43.9 PULMONARY EMPHYSEMA, UNSPECIFIED EMPHYSEMA TYPE (MULTI): ICD-10-CM

## 2024-07-15 DIAGNOSIS — E03.9 ACQUIRED HYPOTHYROIDISM: ICD-10-CM

## 2024-07-15 DIAGNOSIS — G89.29 CHRONIC PAIN OF LEFT KNEE: ICD-10-CM

## 2024-07-15 DIAGNOSIS — R26.2 DIFFICULTY WALKING: ICD-10-CM

## 2024-07-15 DIAGNOSIS — M25.562 CHRONIC PAIN OF LEFT KNEE: ICD-10-CM

## 2024-07-15 DIAGNOSIS — C34.31 MALIGNANT NEOPLASM OF LOWER LOBE OF RIGHT LUNG (MULTI): Primary | ICD-10-CM

## 2024-07-15 PROBLEM — R58 RETROPERITONEAL HEMORRHAGE: Status: RESOLVED | Noted: 2023-12-01 | Resolved: 2024-07-15

## 2024-07-15 PROBLEM — M79.89 LEFT LEG SWELLING: Status: RESOLVED | Noted: 2023-09-05 | Resolved: 2024-07-15

## 2024-07-15 PROBLEM — I71.40 AAA (ABDOMINAL AORTIC ANEURYSM) (CMS-HCC): Status: RESOLVED | Noted: 2023-09-05 | Resolved: 2024-07-15

## 2024-07-15 PROBLEM — R07.81 RIB PAIN: Status: RESOLVED | Noted: 2023-09-05 | Resolved: 2024-07-15

## 2024-07-15 PROBLEM — L21.0 SEBORRHEA CAPITIS: Status: RESOLVED | Noted: 2023-09-05 | Resolved: 2024-07-15

## 2024-07-15 PROBLEM — R07.89 PAIN, CHEST WALL: Status: RESOLVED | Noted: 2023-09-05 | Resolved: 2024-07-15

## 2024-07-15 PROBLEM — I73.9 CLAUDICATION, INTERMITTENT (CMS-HCC): Status: RESOLVED | Noted: 2023-09-05 | Resolved: 2024-07-15

## 2024-07-15 PROBLEM — S20.20XA CONTUSION, TRUNK: Status: RESOLVED | Noted: 2023-09-05 | Resolved: 2024-07-15

## 2024-07-15 PROBLEM — R60.0 EDEMA LEG: Status: RESOLVED | Noted: 2023-09-05 | Resolved: 2024-07-15

## 2024-07-15 PROBLEM — R10.9 ABDOMINAL PAIN: Status: RESOLVED | Noted: 2023-09-05 | Resolved: 2024-07-15

## 2024-07-15 PROBLEM — I73.9 PAD (PERIPHERAL ARTERY DISEASE) (CMS-HCC): Status: RESOLVED | Noted: 2023-09-05 | Resolved: 2024-07-15

## 2024-07-15 PROBLEM — M62.830 BACK MUSCLE SPASM: Status: RESOLVED | Noted: 2023-09-05 | Resolved: 2024-07-15

## 2024-07-15 PROBLEM — M67.919 DISORDER OF ROTATOR CUFF: Status: RESOLVED | Noted: 2023-09-05 | Resolved: 2024-07-15

## 2024-07-15 PROBLEM — R53.83 FATIGUE: Status: RESOLVED | Noted: 2023-09-05 | Resolved: 2024-07-15

## 2024-07-15 PROBLEM — R30.0 DYSURIA: Status: RESOLVED | Noted: 2023-09-05 | Resolved: 2024-07-15

## 2024-07-15 PROBLEM — R49.0 HOARSENESS: Status: RESOLVED | Noted: 2024-02-22 | Resolved: 2024-07-15

## 2024-07-15 PROBLEM — M25.519 SHOULDER PAIN: Status: RESOLVED | Noted: 2023-09-05 | Resolved: 2024-07-15

## 2024-07-15 PROBLEM — I70.202 FEMORAL ARTERY OCCLUSION, LEFT (CMS-HCC): Status: RESOLVED | Noted: 2023-09-05 | Resolved: 2024-07-15

## 2024-07-15 PROBLEM — F41.9 ANXIETY: Status: RESOLVED | Noted: 2023-09-05 | Resolved: 2024-07-15

## 2024-07-15 PROBLEM — T81.89XA PROBLEM INVOLVING SURGICAL INCISION: Status: RESOLVED | Noted: 2023-09-05 | Resolved: 2024-07-15

## 2024-07-15 PROBLEM — R10.30 SEVERE GROIN PAIN: Status: RESOLVED | Noted: 2023-09-05 | Resolved: 2024-07-15

## 2024-07-15 PROBLEM — M79.604 PAIN OF RIGHT LOWER EXTREMITY: Status: RESOLVED | Noted: 2023-09-05 | Resolved: 2024-07-15

## 2024-07-15 PROBLEM — R53.1 WEAKNESS GENERALIZED: Status: RESOLVED | Noted: 2023-09-05 | Resolved: 2024-07-15

## 2024-07-15 PROBLEM — R20.2 PARESTHESIAS: Status: RESOLVED | Noted: 2023-09-05 | Resolved: 2024-07-15

## 2024-07-15 PROBLEM — L30.1 DYSHIDROSIS: Status: RESOLVED | Noted: 2023-09-05 | Resolved: 2024-07-15

## 2024-07-15 PROBLEM — R58 ECCHYMOSIS: Status: RESOLVED | Noted: 2023-09-05 | Resolved: 2024-07-15

## 2024-07-15 PROBLEM — I74.3: Status: RESOLVED | Noted: 2023-12-01 | Resolved: 2024-07-15

## 2024-07-15 PROBLEM — S30.1XXA GROIN HEMATOMA: Status: RESOLVED | Noted: 2023-09-05 | Resolved: 2024-07-15

## 2024-07-15 PROBLEM — G89.18 POST-OP PAIN: Status: RESOLVED | Noted: 2023-09-05 | Resolved: 2024-07-15

## 2024-07-15 PROBLEM — C34.91 ADENOCARCINOMA OF RIGHT LUNG (MULTI): Status: RESOLVED | Noted: 2023-09-05 | Resolved: 2024-07-15

## 2024-07-15 LAB
ALBUMIN SERPL BCP-MCNC: 4.2 G/DL (ref 3.4–5)
ALP SERPL-CCNC: 60 U/L (ref 33–136)
ALT SERPL W P-5'-P-CCNC: 19 U/L (ref 10–52)
ANION GAP SERPL CALC-SCNC: 14 MMOL/L (ref 10–20)
AST SERPL W P-5'-P-CCNC: 18 U/L (ref 9–39)
BILIRUB SERPL-MCNC: 0.5 MG/DL (ref 0–1.2)
BUN SERPL-MCNC: 20 MG/DL (ref 6–23)
CALCIUM SERPL-MCNC: 9.1 MG/DL (ref 8.6–10.3)
CHLORIDE SERPL-SCNC: 105 MMOL/L (ref 98–107)
CHOLEST SERPL-MCNC: 188 MG/DL (ref 0–199)
CHOLESTEROL/HDL RATIO: 5.9
CO2 SERPL-SCNC: 25 MMOL/L (ref 21–32)
CREAT SERPL-MCNC: 0.67 MG/DL (ref 0.5–1.3)
EGFRCR SERPLBLD CKD-EPI 2021: >90 ML/MIN/1.73M*2
ERYTHROCYTE [DISTWIDTH] IN BLOOD BY AUTOMATED COUNT: 12.9 % (ref 11.5–14.5)
GLUCOSE SERPL-MCNC: 85 MG/DL (ref 74–99)
HCT VFR BLD AUTO: 45.6 % (ref 41–52)
HDLC SERPL-MCNC: 31.8 MG/DL
HGB BLD-MCNC: 15.1 G/DL (ref 13.5–17.5)
LDLC SERPL CALC-MCNC: 128 MG/DL
MCH RBC QN AUTO: 31.9 PG (ref 26–34)
MCHC RBC AUTO-ENTMCNC: 33.1 G/DL (ref 32–36)
MCV RBC AUTO: 96 FL (ref 80–100)
NON HDL CHOLESTEROL: 156 MG/DL (ref 0–149)
NRBC BLD-RTO: 0 /100 WBCS (ref 0–0)
PLATELET # BLD AUTO: 183 X10*3/UL (ref 150–450)
POTASSIUM SERPL-SCNC: 4.1 MMOL/L (ref 3.5–5.3)
PROT SERPL-MCNC: 6.6 G/DL (ref 6.4–8.2)
RBC # BLD AUTO: 4.74 X10*6/UL (ref 4.5–5.9)
SODIUM SERPL-SCNC: 140 MMOL/L (ref 136–145)
TRIGL SERPL-MCNC: 142 MG/DL (ref 0–149)
VLDL: 28 MG/DL (ref 0–40)
WBC # BLD AUTO: 5.5 X10*3/UL (ref 4.4–11.3)

## 2024-07-15 PROCEDURE — 73562 X-RAY EXAM OF KNEE 3: CPT | Mod: LEFT SIDE | Performed by: RADIOLOGY

## 2024-07-15 PROCEDURE — 73562 X-RAY EXAM OF KNEE 3: CPT | Mod: LT

## 2024-07-15 PROCEDURE — 99214 OFFICE O/P EST MOD 30 MIN: CPT | Performed by: FAMILY MEDICINE

## 2024-07-15 PROCEDURE — 1036F TOBACCO NON-USER: CPT | Performed by: FAMILY MEDICINE

## 2024-07-15 PROCEDURE — 36415 COLL VENOUS BLD VENIPUNCTURE: CPT

## 2024-07-15 PROCEDURE — 80053 COMPREHEN METABOLIC PANEL: CPT

## 2024-07-15 PROCEDURE — 85027 COMPLETE CBC AUTOMATED: CPT

## 2024-07-15 PROCEDURE — 1159F MED LIST DOCD IN RCRD: CPT | Performed by: FAMILY MEDICINE

## 2024-07-15 PROCEDURE — 1160F RVW MEDS BY RX/DR IN RCRD: CPT | Performed by: FAMILY MEDICINE

## 2024-07-15 PROCEDURE — 1157F ADVNC CARE PLAN IN RCRD: CPT | Performed by: FAMILY MEDICINE

## 2024-07-15 PROCEDURE — 80061 LIPID PANEL: CPT

## 2024-07-15 PROCEDURE — G2211 COMPLEX E/M VISIT ADD ON: HCPCS | Performed by: FAMILY MEDICINE

## 2024-07-15 ASSESSMENT — PATIENT HEALTH QUESTIONNAIRE - PHQ9
1. LITTLE INTEREST OR PLEASURE IN DOING THINGS: NOT AT ALL
2. FEELING DOWN, DEPRESSED OR HOPELESS: NOT AT ALL
SUM OF ALL RESPONSES TO PHQ9 QUESTIONS 1 AND 2: 0

## 2024-07-15 NOTE — PROGRESS NOTES
"Subjective   Patient ID: Lauri Dolan is a 69 y.o. male who presents for Referral for prosthetic.     Peripheral arterial disease, history of AKA on the right, history of stenting on the left and aortic aneurysm repair  amputation on right due to failed stents ,  with [facility]     Wide prosthesis on right hip   Has lost a lot of weight/ volume in the hip, feels like the prosthetic people did not fit correctly originally  Has been 3 years   Needs supplies ,  Etc  No pain,  no skin breakdown      Some foot arthritis ,    Tried some inserts without relief   In bones of feet with arthritis   End of day , gets pain on left   Left knee with pain   Saw podiatry , got a shot , shoes not helping   Naproxen some relief   Chronic back pain   No tingling      BPH : Had a scope recently with urology, is on medications, may need surgery at some point  Elevated PSA      COPD: Here for follow-up on COPD.  Sd, seeing pulmonology  Patient is currently taking maintenance inhalers, occasional symptomatic inhalers.     Lung cancer: Seeing oncology off keytruda, stable, getting routine monitoring with CTs     Is currently on aspirin and Plavix  Coronary artery disease: On statin,            Review of Systems    Objective   /78   Pulse 82   Temp 36.9 °C (98.4 °F)   Ht 1.753 m (5' 9\")   Wt 76.7 kg (169 lb)   SpO2 97%   BMI 24.96 kg/m²     Physical Exam  Constitutional:       Appearance: Normal appearance. He is well-developed.   Cardiovascular:      Rate and Rhythm: Normal rate and regular rhythm.      Heart sounds: Normal heart sounds. No murmur heard.  Pulmonary:      Effort: Pulmonary effort is normal.      Breath sounds: Normal breath sounds.   Musculoskeletal:      Comments: Right lower extremity with AKA  Large bulky prosthesis with wide swinging gait  Left knee: No edema, some tenderness  Left foot with some tenderness along the medial foot arch   Neurological:      General: No focal deficit present.      Mental " Status: He is alert.   Psychiatric:         Mood and Affect: Mood normal.         Behavior: Behavior normal.         Assessment/Plan   Problem List Items Addressed This Visit             ICD-10-CM    COPD (chronic obstructive pulmonary disease) (Multi) J44.9    Relevant Orders    Comprehensive Metabolic Panel    CBC    Lipid Panel    Difficulty walking R26.2    Relevant Orders    Referral to Physical Medicine Rehab    Comprehensive Metabolic Panel    CBC    Lipid Panel    Unilateral AKA, right (Multi) S78.111A    Relevant Orders    Referral to Physical Medicine Rehab    Comprehensive Metabolic Panel    CBC    Lipid Panel    Malignant neoplasm of lung (Multi) - Primary C34.90    Relevant Orders    Comprehensive Metabolic Panel    CBC    Lipid Panel    Hypothyroidism E03.9    Relevant Orders    Comprehensive Metabolic Panel    CBC    Lipid Panel     Other Visit Diagnoses         Codes    Chronic pain of left knee     M25.562, G89.29    Relevant Orders    XR knee left 3 views

## 2024-07-15 NOTE — PATIENT INSTRUCTIONS
Patient appears to have a pretty abnormal gait, suspect that he needs a better fitting prosthesis  Suspect a lot of of the musculoskeletal pain on the left extremity is likely related to his poor gait    Would suggest refer to Physical medicine and Rehab to fully assess his gait and give suggestions on the prostheses  :Dr mello or Dr Toribio's     [facility] referral for supplies in the meantime    Xray knee    Continue management of

## 2024-07-30 DIAGNOSIS — L71.9 ROSACEA: ICD-10-CM

## 2024-07-30 RX ORDER — METRONIDAZOLE 7.5 MG/G
15 GEL TOPICAL 2 TIMES DAILY
COMMUNITY
End: 2024-07-30 | Stop reason: SDUPTHER

## 2024-07-30 RX ORDER — METRONIDAZOLE 7.5 MG/G
1 GEL TOPICAL 2 TIMES DAILY
Qty: 15 G | Refills: 2 | Status: SHIPPED | OUTPATIENT
Start: 2024-07-30

## 2024-08-08 ENCOUNTER — TELEPHONE (OUTPATIENT)
Dept: PRIMARY CARE | Facility: CLINIC | Age: 69
End: 2024-08-08
Payer: MEDICARE

## 2024-08-08 NOTE — TELEPHONE ENCOUNTER
She is calling b/c they need to do a Peer to Peer for his Prosthetic. The 2 codes that are not covered are  and     There are several that are covered                              They must be called back by 8/12/2024

## 2024-08-19 DIAGNOSIS — M50.30 DDD (DEGENERATIVE DISC DISEASE), CERVICAL: ICD-10-CM

## 2024-08-20 RX ORDER — CYCLOBENZAPRINE HCL 10 MG
10 TABLET ORAL 3 TIMES DAILY PRN
Qty: 90 TABLET | Refills: 1 | Status: SHIPPED | OUTPATIENT
Start: 2024-08-20

## 2024-08-28 ENCOUNTER — APPOINTMENT (OUTPATIENT)
Dept: UROLOGY | Facility: CLINIC | Age: 69
End: 2024-08-28
Payer: MEDICARE

## 2024-11-11 DIAGNOSIS — M50.30 DDD (DEGENERATIVE DISC DISEASE), CERVICAL: ICD-10-CM

## 2024-11-11 RX ORDER — CYCLOBENZAPRINE HCL 10 MG
10 TABLET ORAL 3 TIMES DAILY PRN
Qty: 90 TABLET | Refills: 0 | Status: SHIPPED | OUTPATIENT
Start: 2024-11-11

## 2024-12-16 DIAGNOSIS — L71.9 ROSACEA: ICD-10-CM

## 2024-12-16 RX ORDER — KETOCONAZOLE 20 MG/ML
SHAMPOO, SUSPENSION TOPICAL 2 TIMES WEEKLY
Qty: 120 ML | Refills: 2 | Status: SHIPPED | OUTPATIENT
Start: 2024-12-16

## 2025-01-30 ENCOUNTER — OFFICE VISIT (OUTPATIENT)
Dept: CARDIOLOGY | Facility: HOSPITAL | Age: 70
End: 2025-01-30
Payer: MEDICARE

## 2025-01-30 VITALS
OXYGEN SATURATION: 97 % | DIASTOLIC BLOOD PRESSURE: 69 MMHG | SYSTOLIC BLOOD PRESSURE: 109 MMHG | HEART RATE: 92 BPM | BODY MASS INDEX: 25.82 KG/M2 | WEIGHT: 174.82 LBS

## 2025-01-30 DIAGNOSIS — I73.9 PAD (PERIPHERAL ARTERY DISEASE) (CMS-HCC): ICD-10-CM

## 2025-01-30 DIAGNOSIS — I25.10 CORONARY ARTERY DISEASE INVOLVING NATIVE CORONARY ARTERY OF NATIVE HEART WITHOUT ANGINA PECTORIS: ICD-10-CM

## 2025-01-30 DIAGNOSIS — E78.5 HYPERLIPIDEMIA, UNSPECIFIED HYPERLIPIDEMIA TYPE: Primary | ICD-10-CM

## 2025-01-30 LAB
ATRIAL RATE: 97 BPM
P AXIS: 70 DEGREES
P OFFSET: 186 MS
P ONSET: 135 MS
PR INTERVAL: 174 MS
Q ONSET: 222 MS
QRS COUNT: 16 BEATS
QRS DURATION: 88 MS
QT INTERVAL: 344 MS
QTC CALCULATION(BAZETT): 436 MS
QTC FREDERICIA: 403 MS
R AXIS: 65 DEGREES
T AXIS: 55 DEGREES
T OFFSET: 394 MS
VENTRICULAR RATE: 97 BPM

## 2025-01-30 PROCEDURE — 93010 ELECTROCARDIOGRAM REPORT: CPT | Performed by: INTERNAL MEDICINE

## 2025-01-30 PROCEDURE — G2211 COMPLEX E/M VISIT ADD ON: HCPCS | Performed by: INTERNAL MEDICINE

## 2025-01-30 PROCEDURE — 99213 OFFICE O/P EST LOW 20 MIN: CPT | Performed by: INTERNAL MEDICINE

## 2025-01-30 PROCEDURE — 1159F MED LIST DOCD IN RCRD: CPT | Performed by: INTERNAL MEDICINE

## 2025-01-30 PROCEDURE — 99213 OFFICE O/P EST LOW 20 MIN: CPT | Mod: 25 | Performed by: INTERNAL MEDICINE

## 2025-01-30 PROCEDURE — 93005 ELECTROCARDIOGRAM TRACING: CPT | Performed by: INTERNAL MEDICINE

## 2025-01-30 PROCEDURE — 1157F ADVNC CARE PLAN IN RCRD: CPT | Performed by: INTERNAL MEDICINE

## 2025-01-30 NOTE — PROGRESS NOTES
Chief Complaint:   No chief complaint on file.     History Of Present Illness:    Lauri Dolan is a 69 y.o. male presenting with ***.     Last Recorded Vitals:  Vitals:    01/30/25 1123   BP: 109/69   Pulse: 92   SpO2: 97%   Weight: 79.3 kg (174 lb 13.2 oz)       Past Medical History:  He has a past medical history of Acute bronchitis (12/01/2023), Bilateral impacted cerumen (02/22/2024), Candidiasis (12/01/2023), Coronary artery disease, Cough (12/01/2023), Lung cancer (Multi), Old myocardial infarction (03/26/2019), Personal history of other diseases of the respiratory system (05/21/2019), Personal history of other diseases of the respiratory system (11/23/2021), Personal history of other malignant neoplasm of skin, Pharyngitis (09/05/2023), and Wheezing (12/01/2023).    Past Surgical History:  He has a past surgical history that includes Hemicolectomy (06/18/2018); Hernia repair (06/18/2018); Other surgical history (06/03/2019); Other surgical history (05/18/2021); Other surgical history (04/12/2022); Other surgical history (08/31/2020); Other surgical history (08/31/2020); Other surgical history (08/31/2020); Other surgical history (04/08/2019); CT angio abdomen pelvis w and or wo IV IV contrast (3/15/2019); CT angio abdomen pelvis w and or wo IV IV contrast (6/26/2020); CT angio aorta and bilateral iliofemoral runoff including without contrast if performed (8/11/2020); CT angio aorta and bilateral iliofemoral runoff including without contrast if performed (2/10/2021); CT angio abdomen pelvis w and or wo IV IV contrast (5/13/2021); and CT angio aorta and bilateral iliofemoral runoff including without contrast if performed (5/19/2021).      Social History:  He reports that he quit smoking about 4 years ago. His smoking use included cigarettes. He has quit using smokeless tobacco.  His smokeless tobacco use included snuff. He reports that he does not currently use alcohol. He reports that he does not currently  use drugs.    Family History:  No family history on file.     Allergies:  Azithromycin and Penicillins    Outpatient Medications:  Current Outpatient Medications   Medication Instructions    acetaminophen (Tylenol) 325 mg tablet TAKE 1 TO 2 TABLETS EVERY 6 HOURS AS NEEDED.    albuterol 90 mcg/actuation inhaler INHALE 1 TO 2 PUFFS BY MOUTH EVERY 4 TO 6 HOURS AS NEEDED    atorvastatin (LIPITOR) 40 mg, oral, Daily    clobetasol (Temovate) 0.05 % external solution APPLY TO SCALP TWICE DAILY FOR 14 DAYS AS NEEDED FOR FLARES/ITCHING.    clopidogrel (PLAVIX) 75 mg, oral, Daily    cyclobenzaprine (FLEXERIL) 10 mg, oral, 3 times daily PRN    ketoconazole (NIZOral) 2 % shampoo Topical, 2 times weekly    metroNIDAZOLE (Metrogel) 0.75 % gel 1 Application, Topical, 2 times daily    tamsulosin (FLOMAX) 0.8 mg, oral, Nightly    tiotropium-olodateroL (Stiolto Respimat) 2.5-2.5 mcg/actuation mist inhaler Stiolto Respimat 2.5-2.5 MCG/ACT Inhalation Aerosol Solution INHALE 2 PUFFS BY MOUTH DAILY Quantity: 4 Refills: 11 Dequan Wright MD Start : 8-Jul-2019 Active    triamcinolone (Kenalog) 0.025 % cream APPLY 2-3 TIMES DAILY TO AFFECTED AREA(S).       Physical Exam:  ***     Last Labs:  CBC -  Lab Results   Component Value Date    WBC 5.5 07/15/2024    HGB 15.1 07/15/2024    HCT 45.6 07/15/2024    MCV 96 07/15/2024     07/15/2024       CMP -  Lab Results   Component Value Date    CALCIUM 9.1 07/15/2024    PHOS 3.6 01/22/2022    PROT 6.6 07/15/2024    ALBUMIN 4.2 07/15/2024    AST 18 07/15/2024    ALT 19 07/15/2024    ALKPHOS 60 07/15/2024    BILITOT 0.5 07/15/2024       LIPID PANEL -   Lab Results   Component Value Date    CHOL 188 07/15/2024    TRIG 142 07/15/2024    HDL 31.8 07/15/2024    CHHDL 5.9 07/15/2024    LDLF - 02/22/2019    VLDL 28 07/15/2024    NHDL 156 (H) 07/15/2024       RENAL FUNCTION PANEL -   Lab Results   Component Value Date    GLUCOSE 85 07/15/2024     07/15/2024    K 4.1 07/15/2024     07/15/2024     CO2 25 07/15/2024    ANIONGAP 14 07/15/2024    BUN 20 07/15/2024    CREATININE 0.67 07/15/2024    GFRMALE CANCELED 05/23/2023    CALCIUM 9.1 07/15/2024    PHOS 3.6 01/22/2022    ALBUMIN 4.2 07/15/2024        Lab Results   Component Value Date    BNP 41 01/21/2022    HGBA1C 5.9 04/23/2019       Last Cardiology Tests:  ECG:  NSR, HR 75bpm     Echo:  Echo 3/18/19:   1. The left ventricular systolic function is normal with a 60-65% estimated  ejection fraction.   2. Spectral Doppler shows an impaired relaxation pattern of left ventricular  diastolic filling.   3. The right ventricle is mild-moderately enlarged. There is normal right  ventricular global systolic function.   4. There is mild tricuspid regurgitation.   5. The estimated pulmonary artery pressure is mildly elevated with the RVSP at  48.1 mmHg.   6. There is a small pericardial effusion.      Cath:  Cath 3/21/2019:  1. Left main: no significant angiographic disease.  2. LAD: diffuse proximal LAD disease with 80% disease prior to D1 bifurcation  with extreme angulation at bifurcation.  3. LCx: 60% proximal disease, 60% mid-vessel stenosis.  4. RCA: 50% angulated mid-vessel disease.  5. LVEDP 13mmHg, no aortic stenosis on LV-Ao gradient.    Assessment/Plan   68 y.o. 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 Per patient, is scheduled for uroscopy d/t elevated PSA.      Plan:  - Continue plavix and resume atorvastatin 40mg daily  - Patient is low risk for perioperative cardiovascular event.  Recommend holding plavix for 5 days prior to uroscopy with plan to resume when safe post-procedure from a Urology standpoint.   -Follow up in 1 year or sooner if needed       Manuel Platt MD   LCx: 60% proximal disease, 60% mid-vessel stenosis.  4. RCA: 50% angulated mid-vessel disease.  5. LVEDP 13mmHg, no aortic stenosis on LV-Ao gradient.    Assessment/Plan   68 y.o. 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  Doing well from CV standpoint.        Plan:  - Continue plavix and atorvastatin 40mg daily  -Follow up in 1 year or sooner if needed       Manuel Platt MD

## 2025-02-05 DIAGNOSIS — I25.10 CORONARY ARTERY DISEASE INVOLVING NATIVE CORONARY ARTERY OF NATIVE HEART WITHOUT ANGINA PECTORIS: ICD-10-CM

## 2025-02-05 RX ORDER — CLOPIDOGREL BISULFATE 75 MG/1
75 TABLET ORAL DAILY
Qty: 90 TABLET | Refills: 0 | Status: SHIPPED | OUTPATIENT
Start: 2025-02-05

## 2025-03-30 RX ORDER — ATORVASTATIN CALCIUM 40 MG/1
40 TABLET, FILM COATED ORAL DAILY
Qty: 90 TABLET | Refills: 3 | Status: SHIPPED | OUTPATIENT
Start: 2025-03-30 | End: 2026-03-30

## 2025-04-15 ENCOUNTER — TELEPHONE (OUTPATIENT)
Dept: PRIMARY CARE | Facility: CLINIC | Age: 70
End: 2025-04-15
Payer: MEDICARE

## 2025-04-15 DIAGNOSIS — R35.0 BENIGN PROSTATIC HYPERPLASIA WITH URINARY FREQUENCY: ICD-10-CM

## 2025-04-15 DIAGNOSIS — N40.1 BENIGN PROSTATIC HYPERPLASIA WITH URINARY FREQUENCY: ICD-10-CM

## 2025-04-15 DIAGNOSIS — B37.9 CANDIDIASIS: ICD-10-CM

## 2025-04-15 RX ORDER — TRIAMCINOLONE ACETONIDE 0.25 MG/G
CREAM TOPICAL 3 TIMES DAILY PRN
Qty: 80 G | Refills: 0 | Status: SHIPPED | OUTPATIENT
Start: 2025-04-15

## 2025-04-16 RX ORDER — TAMSULOSIN HYDROCHLORIDE 0.4 MG/1
0.8 CAPSULE ORAL NIGHTLY
Qty: 120 CAPSULE | Refills: 0 | Status: SHIPPED | OUTPATIENT
Start: 2025-04-16

## 2025-04-28 ENCOUNTER — TELEPHONE (OUTPATIENT)
Dept: PRIMARY CARE | Facility: CLINIC | Age: 70
End: 2025-04-28

## 2025-04-28 ENCOUNTER — APPOINTMENT (OUTPATIENT)
Dept: PRIMARY CARE | Facility: CLINIC | Age: 70
End: 2025-04-28
Payer: MEDICARE

## 2025-04-28 VITALS
SYSTOLIC BLOOD PRESSURE: 102 MMHG | HEIGHT: 69 IN | TEMPERATURE: 98.6 F | BODY MASS INDEX: 25.92 KG/M2 | OXYGEN SATURATION: 96 % | WEIGHT: 175 LBS | DIASTOLIC BLOOD PRESSURE: 67 MMHG | HEART RATE: 61 BPM

## 2025-04-28 DIAGNOSIS — E03.9 ACQUIRED HYPOTHYROIDISM: ICD-10-CM

## 2025-04-28 DIAGNOSIS — K29.00 OTHER ACUTE GASTRITIS WITHOUT HEMORRHAGE: ICD-10-CM

## 2025-04-28 DIAGNOSIS — R19.5 ABNORMAL STOOL CALIBER: ICD-10-CM

## 2025-04-28 DIAGNOSIS — J47.9 BRONCHIECTASIS, UNCOMPLICATED (MULTI): ICD-10-CM

## 2025-04-28 DIAGNOSIS — Z12.11 COLON CANCER SCREENING: ICD-10-CM

## 2025-04-28 DIAGNOSIS — Z97.8 USES PROSTHESIS: Primary | ICD-10-CM

## 2025-04-28 DIAGNOSIS — J43.1 PANLOBULAR EMPHYSEMA (MULTI): ICD-10-CM

## 2025-04-28 DIAGNOSIS — R10.84 GENERALIZED ABDOMINAL PAIN: ICD-10-CM

## 2025-04-28 DIAGNOSIS — C34.31 MALIGNANT NEOPLASM OF LOWER LOBE, RIGHT BRONCHUS OR LUNG: ICD-10-CM

## 2025-04-28 DIAGNOSIS — E78.5 HYPERLIPIDEMIA, UNSPECIFIED HYPERLIPIDEMIA TYPE: ICD-10-CM

## 2025-04-28 DIAGNOSIS — I73.9 PAD (PERIPHERAL ARTERY DISEASE) (CMS-HCC): ICD-10-CM

## 2025-04-28 PROBLEM — R26.2 DIFFICULTY WALKING: Status: RESOLVED | Noted: 2023-09-05 | Resolved: 2025-04-28

## 2025-04-28 PROBLEM — I21.9 MYOCARDIAL INFARCTION (MULTI): Status: RESOLVED | Noted: 2023-12-01 | Resolved: 2025-04-28

## 2025-04-28 PROBLEM — C34.90 MALIGNANT NEOPLASM OF LUNG (MULTI): Status: RESOLVED | Noted: 2022-02-07 | Resolved: 2025-04-28

## 2025-04-28 PROBLEM — J44.9 COPD (CHRONIC OBSTRUCTIVE PULMONARY DISEASE) (MULTI): Status: RESOLVED | Noted: 2023-09-05 | Resolved: 2025-04-28

## 2025-04-28 PROCEDURE — 1036F TOBACCO NON-USER: CPT | Performed by: FAMILY MEDICINE

## 2025-04-28 PROCEDURE — 1159F MED LIST DOCD IN RCRD: CPT | Performed by: FAMILY MEDICINE

## 2025-04-28 PROCEDURE — 3008F BODY MASS INDEX DOCD: CPT | Performed by: FAMILY MEDICINE

## 2025-04-28 PROCEDURE — 1160F RVW MEDS BY RX/DR IN RCRD: CPT | Performed by: FAMILY MEDICINE

## 2025-04-28 PROCEDURE — 99214 OFFICE O/P EST MOD 30 MIN: CPT | Performed by: FAMILY MEDICINE

## 2025-04-28 PROCEDURE — 1157F ADVNC CARE PLAN IN RCRD: CPT | Performed by: FAMILY MEDICINE

## 2025-04-28 PROCEDURE — G2211 COMPLEX E/M VISIT ADD ON: HCPCS | Performed by: FAMILY MEDICINE

## 2025-04-28 RX ORDER — PANTOPRAZOLE SODIUM 40 MG/1
40 TABLET, DELAYED RELEASE ORAL DAILY
Qty: 30 TABLET | Refills: 1 | Status: SHIPPED | OUTPATIENT
Start: 2025-04-28 | End: 2025-06-27

## 2025-04-28 ASSESSMENT — PATIENT HEALTH QUESTIONNAIRE - PHQ9
1. LITTLE INTEREST OR PLEASURE IN DOING THINGS: NOT AT ALL
SUM OF ALL RESPONSES TO PHQ9 QUESTIONS 1 AND 2: 0
2. FEELING DOWN, DEPRESSED OR HOPELESS: NOT AT ALL

## 2025-04-28 NOTE — H&P (VIEW-ONLY)
"Subjective   Patient ID: Lauri Dolan is a 70 y.o. male who presents for Abdominal Pain. States this onset about 6 weeks ago; has occurred three times thus far. States the pain he did experience was located in his LLQ. Bowel pattern has also changed; wonders if taking too many NSAID's for his foot pain are related to his GI issues.    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  Sd, seeing pulmonology  Patient is currently taking maintenance inhalers, occasional symptomatic inhalers.     Lung cancer: Seeing oncology,  stable, getting routine monitoring with CTs     Is currently on aspirin and Plavix  Coronary artery disease: On statin,     Partial colon resection for diverticulitis in the past              Review of Systems    Objective   /67   Pulse 61   Temp 37 °C (98.6 °F)   Ht 1.753 m (5' 9\")   Wt 79.4 kg (175 lb)   SpO2 96%   BMI 25.84 kg/m²     Physical Exam  Constitutional:       Appearance: Normal appearance. He is well-developed.   Cardiovascular:      Rate and Rhythm: Normal rate and regular rhythm.      Heart sounds: Normal heart sounds. No murmur heard.  Pulmonary:      Effort: Pulmonary effort is normal.      Breath sounds: Normal breath sounds.   Abdominal:      General: Abdomen is flat. " There is no distension.      Palpations: Abdomen is soft. There is no mass.      Tenderness: There is no abdominal tenderness. There is no guarding or rebound.      Hernia: No hernia is present.   Genitourinary:     Rectum: Guaiac result negative.      Comments: Stool black in color but guaiac negative  Musculoskeletal:      Comments: Right lower extremity AKA  Prosthesis in place   Neurological:      General: No focal deficit present.      Mental Status: He is alert and oriented to person, place, and time.   Psychiatric:         Mood and Affect: Mood normal.         Behavior: Behavior normal.         Assessment/Plan   Problem List Items Addressed This Visit           ICD-10-CM    RESOLVED: COPD (chronic obstructive pulmonary disease) (Multi) J44.9    Relevant Orders    CBC and Auto Differential    Comprehensive Metabolic Panel    Amylase    Hyperlipidemia E78.5    Relevant Orders    CBC and Auto Differential    Comprehensive Metabolic Panel    Amylase    PAD (peripheral artery disease) (CMS-HCC) I73.9    Relevant Orders    CBC and Auto Differential    Comprehensive Metabolic Panel    Amylase    Uses prosthesis - Primary Z97.8    Relevant Orders    CBC and Auto Differential    Comprehensive Metabolic Panel    Amylase    Hypothyroidism E03.9    Relevant Orders    CBC and Auto Differential    Comprehensive Metabolic Panel    Amylase     Other Visit Diagnoses         Codes      Malignant neoplasm of lower lobe, right bronchus or lung     C34.31    Relevant Orders    CBC and Auto Differential    Comprehensive Metabolic Panel    Amylase      Bronchiectasis, uncomplicated (Multi)     J47.9    Relevant Orders    CBC and Auto Differential    Comprehensive Metabolic Panel    Amylase      Colon cancer screening     Z12.11    Relevant Medications    pantoprazole (ProtoNix) 40 mg EC tablet    Other Relevant Orders    CBC and Auto Differential    Comprehensive Metabolic Panel    Amylase    Colonoscopy Screening; Average Risk  Patient    Esophagogastroduodenoscopy (EGD)      Other acute gastritis without hemorrhage     K29.00    Relevant Medications    pantoprazole (ProtoNix) 40 mg EC tablet    Other Relevant Orders    CBC and Auto Differential    Comprehensive Metabolic Panel    Amylase    Colonoscopy Screening; Average Risk Patient    Esophagogastroduodenoscopy (EGD)      Generalized abdominal pain     R10.84    Relevant Medications    pantoprazole (ProtoNix) 40 mg EC tablet    Other Relevant Orders    CBC and Auto Differential    Comprehensive Metabolic Panel    Amylase    Colonoscopy Screening; Average Risk Patient    Esophagogastroduodenoscopy (EGD)      Abnormal stool caliber     R19.5

## 2025-04-28 NOTE — PATIENT INSTRUCTIONS
Get your blood work as ordered.  You should hear from our office with results whether they are normal are not within a few days.  Please call the office if you do not hear from us.     GERD  Take prescribed medication as written.  May take TUMS or Rolaids as needed.  No eating within 2 hours of going to bed.  May want to elevate the head of your bed.  Avoid caffeine, chocolate, alcohol, spicy foods, acidic foods, fried foods, mint flavoring.  Call or return to the office if your symptoms change,  worsen, or fail to improve.   There are 2 different types of acid reduction medications: The proton pump inhibitor such as Prilosec, Prevacid, pantoprazole, worked very well but they can take 7 to 8 hours to kick in.  We use these for things like ulcers and gastritis as well as chronic reflux, heartburn  The other class is H2 blockers this includes Pepcid, Pepcid Complete: This is a good option to take symptomatically because the onset of action is within 45 minutes to an hour.       Colonoscopy and EGD    Call with any worsening symptoms, if the stool color does not improve, blood in the stool increased abdominal pain etc.    Add on stomach medications for presumptive gastritis  Hold off on the NSAIDs  Tylenol is okay

## 2025-04-29 LAB
ALBUMIN SERPL-MCNC: 4.2 G/DL (ref 3.6–5.1)
ALP SERPL-CCNC: 57 U/L (ref 35–144)
ALT SERPL-CCNC: 24 U/L (ref 9–46)
AMYLASE SERPL-CCNC: 35 U/L (ref 21–101)
ANION GAP SERPL CALCULATED.4IONS-SCNC: 9 MMOL/L (CALC) (ref 7–17)
AST SERPL-CCNC: 28 U/L (ref 10–35)
BASOPHILS # BLD AUTO: 22 CELLS/UL (ref 0–200)
BASOPHILS NFR BLD AUTO: 0.5 %
BILIRUB SERPL-MCNC: 0.3 MG/DL (ref 0.2–1.2)
BUN SERPL-MCNC: 11 MG/DL (ref 7–25)
CALCIUM SERPL-MCNC: 8.8 MG/DL (ref 8.6–10.3)
CHLORIDE SERPL-SCNC: 107 MMOL/L (ref 98–110)
CO2 SERPL-SCNC: 24 MMOL/L (ref 20–32)
CREAT SERPL-MCNC: 0.57 MG/DL (ref 0.7–1.28)
EGFRCR SERPLBLD CKD-EPI 2021: 104 ML/MIN/1.73M2
EOSINOPHIL # BLD AUTO: 141 CELLS/UL (ref 15–500)
EOSINOPHIL NFR BLD AUTO: 3.2 %
ERYTHROCYTE [DISTWIDTH] IN BLOOD BY AUTOMATED COUNT: 13 % (ref 11–15)
GLUCOSE SERPL-MCNC: 106 MG/DL (ref 65–139)
HCT VFR BLD AUTO: 42.4 % (ref 38.5–50)
HGB BLD-MCNC: 14.4 G/DL (ref 13.2–17.1)
LYMPHOCYTES # BLD AUTO: 1109 CELLS/UL (ref 850–3900)
LYMPHOCYTES NFR BLD AUTO: 25.2 %
MCH RBC QN AUTO: 32.1 PG (ref 27–33)
MCHC RBC AUTO-ENTMCNC: 34 G/DL (ref 32–36)
MCV RBC AUTO: 94.6 FL (ref 80–100)
MONOCYTES # BLD AUTO: 361 CELLS/UL (ref 200–950)
MONOCYTES NFR BLD AUTO: 8.2 %
NEUTROPHILS # BLD AUTO: 2768 CELLS/UL (ref 1500–7800)
NEUTROPHILS NFR BLD AUTO: 62.9 %
PLATELET # BLD AUTO: 182 THOUSAND/UL (ref 140–400)
PMV BLD REES-ECKER: 10.8 FL (ref 7.5–12.5)
POTASSIUM SERPL-SCNC: 4.2 MMOL/L (ref 3.5–5.3)
PROT SERPL-MCNC: 6.6 G/DL (ref 6.1–8.1)
RBC # BLD AUTO: 4.48 MILLION/UL (ref 4.2–5.8)
SODIUM SERPL-SCNC: 140 MMOL/L (ref 135–146)
WBC # BLD AUTO: 4.4 THOUSAND/UL (ref 3.8–10.8)

## 2025-05-07 ENCOUNTER — HOSPITAL ENCOUNTER (OUTPATIENT)
Dept: RESPIRATORY THERAPY | Facility: HOSPITAL | Age: 70
Discharge: HOME | End: 2025-05-07
Payer: MEDICARE

## 2025-05-07 DIAGNOSIS — J43.9 PULMONARY EMPHYSEMA, UNSPECIFIED EMPHYSEMA TYPE (MULTI): ICD-10-CM

## 2025-05-07 LAB
MGC ASCENT PFT - FEV1 - POST: 2.19
MGC ASCENT PFT - FEV1 - PRE: 1.99
MGC ASCENT PFT - FEV1 - PREDICTED: 2.9
MGC ASCENT PFT - FVC - POST: 4.65
MGC ASCENT PFT - FVC - PRE: 4.22
MGC ASCENT PFT - FVC - PREDICTED: 3.84

## 2025-05-07 PROCEDURE — 94060 EVALUATION OF WHEEZING: CPT | Performed by: INTERNAL MEDICINE

## 2025-05-07 PROCEDURE — 94729 DIFFUSING CAPACITY: CPT | Performed by: INTERNAL MEDICINE

## 2025-05-07 PROCEDURE — 94729 DIFFUSING CAPACITY: CPT

## 2025-05-07 PROCEDURE — 94060 EVALUATION OF WHEEZING: CPT

## 2025-05-07 PROCEDURE — 94726 PLETHYSMOGRAPHY LUNG VOLUMES: CPT

## 2025-05-07 PROCEDURE — 94760 N-INVAS EAR/PLS OXIMETRY 1: CPT

## 2025-05-07 PROCEDURE — 94726 PLETHYSMOGRAPHY LUNG VOLUMES: CPT | Performed by: INTERNAL MEDICINE

## 2025-05-07 PROCEDURE — 94664 DEMO&/EVAL PT USE INHALER: CPT

## 2025-05-09 DIAGNOSIS — M50.30 DDD (DEGENERATIVE DISC DISEASE), CERVICAL: ICD-10-CM

## 2025-05-12 RX ORDER — CYCLOBENZAPRINE HCL 10 MG
10 TABLET ORAL 3 TIMES DAILY PRN
Qty: 90 TABLET | Refills: 1 | Status: SHIPPED | OUTPATIENT
Start: 2025-05-12

## 2025-05-15 ENCOUNTER — TELEPHONE (OUTPATIENT)
Dept: PREADMISSION TESTING | Facility: HOSPITAL | Age: 70
End: 2025-05-15

## 2025-05-16 ENCOUNTER — ANESTHESIA EVENT (OUTPATIENT)
Dept: OPERATING ROOM | Facility: HOSPITAL | Age: 70
End: 2025-05-16
Payer: MEDICARE

## 2025-05-16 RX ORDER — ONDANSETRON HYDROCHLORIDE 2 MG/ML
4 INJECTION, SOLUTION INTRAVENOUS ONCE AS NEEDED
Status: CANCELLED | OUTPATIENT
Start: 2025-05-16

## 2025-05-16 RX ORDER — DROPERIDOL 2.5 MG/ML
0.62 INJECTION, SOLUTION INTRAMUSCULAR; INTRAVENOUS ONCE AS NEEDED
Status: CANCELLED | OUTPATIENT
Start: 2025-05-16

## 2025-05-19 ENCOUNTER — ANESTHESIA (OUTPATIENT)
Dept: OPERATING ROOM | Facility: HOSPITAL | Age: 70
End: 2025-05-19
Payer: MEDICARE

## 2025-05-19 ENCOUNTER — HOSPITAL ENCOUNTER (OUTPATIENT)
Dept: OPERATING ROOM | Facility: HOSPITAL | Age: 70
Setting detail: OUTPATIENT SURGERY
Discharge: HOME | End: 2025-05-19
Payer: MEDICARE

## 2025-05-19 VITALS
TEMPERATURE: 97 F | SYSTOLIC BLOOD PRESSURE: 128 MMHG | HEIGHT: 69 IN | WEIGHT: 174.16 LBS | OXYGEN SATURATION: 98 % | BODY MASS INDEX: 25.8 KG/M2 | RESPIRATION RATE: 15 BRPM | DIASTOLIC BLOOD PRESSURE: 73 MMHG | HEART RATE: 82 BPM

## 2025-05-19 DIAGNOSIS — K29.00 OTHER ACUTE GASTRITIS WITHOUT HEMORRHAGE: ICD-10-CM

## 2025-05-19 DIAGNOSIS — Z12.11 COLON CANCER SCREENING: ICD-10-CM

## 2025-05-19 DIAGNOSIS — R10.84 GENERALIZED ABDOMINAL PAIN: ICD-10-CM

## 2025-05-19 PROBLEM — J45.909 ASTHMA: Status: ACTIVE | Noted: 2025-05-19

## 2025-05-19 PROBLEM — K21.9 GASTROESOPHAGEAL REFLUX DISEASE: Status: ACTIVE | Noted: 2025-05-19

## 2025-05-19 PROCEDURE — 3600000002 HC OR TIME - INITIAL BASE CHARGE - PROCEDURE LEVEL TWO: Performed by: STUDENT IN AN ORGANIZED HEALTH CARE EDUCATION/TRAINING PROGRAM

## 2025-05-19 PROCEDURE — 2500000004 HC RX 250 GENERAL PHARMACY W/ HCPCS (ALT 636 FOR OP/ED): Performed by: NURSE ANESTHETIST, CERTIFIED REGISTERED

## 2025-05-19 PROCEDURE — 3700000001 HC GENERAL ANESTHESIA TIME - INITIAL BASE CHARGE: Performed by: STUDENT IN AN ORGANIZED HEALTH CARE EDUCATION/TRAINING PROGRAM

## 2025-05-19 PROCEDURE — 2500000004 HC RX 250 GENERAL PHARMACY W/ HCPCS (ALT 636 FOR OP/ED): Mod: JZ | Performed by: ANESTHESIOLOGY

## 2025-05-19 PROCEDURE — 3600000007 HC OR TIME - EACH INCREMENTAL 1 MINUTE - PROCEDURE LEVEL TWO: Performed by: STUDENT IN AN ORGANIZED HEALTH CARE EDUCATION/TRAINING PROGRAM

## 2025-05-19 PROCEDURE — 43239 EGD BIOPSY SINGLE/MULTIPLE: CPT | Performed by: INTERNAL MEDICINE

## 2025-05-19 PROCEDURE — 7100000009 HC PHASE TWO TIME - INITIAL BASE CHARGE: Performed by: STUDENT IN AN ORGANIZED HEALTH CARE EDUCATION/TRAINING PROGRAM

## 2025-05-19 PROCEDURE — 45381 COLONOSCOPY SUBMUCOUS NJX: CPT | Performed by: INTERNAL MEDICINE

## 2025-05-19 PROCEDURE — 7100000010 HC PHASE TWO TIME - EACH INCREMENTAL 1 MINUTE: Performed by: STUDENT IN AN ORGANIZED HEALTH CARE EDUCATION/TRAINING PROGRAM

## 2025-05-19 PROCEDURE — 45385 COLONOSCOPY W/LESION REMOVAL: CPT | Performed by: INTERNAL MEDICINE

## 2025-05-19 PROCEDURE — 2720000007 HC OR 272 NO HCPCS: Performed by: STUDENT IN AN ORGANIZED HEALTH CARE EDUCATION/TRAINING PROGRAM

## 2025-05-19 PROCEDURE — 3700000002 HC GENERAL ANESTHESIA TIME - EACH INCREMENTAL 1 MINUTE: Performed by: STUDENT IN AN ORGANIZED HEALTH CARE EDUCATION/TRAINING PROGRAM

## 2025-05-19 RX ORDER — PROPOFOL 10 MG/ML
INJECTION, EMULSION INTRAVENOUS AS NEEDED
Status: DISCONTINUED | OUTPATIENT
Start: 2025-05-19 | End: 2025-05-19

## 2025-05-19 RX ORDER — LIDOCAINE HYDROCHLORIDE 10 MG/ML
INJECTION, SOLUTION EPIDURAL; INFILTRATION; INTRACAUDAL; PERINEURAL AS NEEDED
Status: DISCONTINUED | OUTPATIENT
Start: 2025-05-19 | End: 2025-05-19

## 2025-05-19 RX ORDER — SODIUM CHLORIDE, SODIUM LACTATE, POTASSIUM CHLORIDE, CALCIUM CHLORIDE 600; 310; 30; 20 MG/100ML; MG/100ML; MG/100ML; MG/100ML
20 INJECTION, SOLUTION INTRAVENOUS CONTINUOUS
Status: DISCONTINUED | OUTPATIENT
Start: 2025-05-19 | End: 2025-05-20 | Stop reason: HOSPADM

## 2025-05-19 RX ADMIN — PROPOFOL 50 MG: 10 INJECTION, EMULSION INTRAVENOUS at 13:10

## 2025-05-19 RX ADMIN — PROPOFOL 100 MG: 10 INJECTION, EMULSION INTRAVENOUS at 12:27

## 2025-05-19 RX ADMIN — PROPOFOL 50 MG: 10 INJECTION, EMULSION INTRAVENOUS at 13:17

## 2025-05-19 RX ADMIN — SODIUM CHLORIDE, POTASSIUM CHLORIDE, SODIUM LACTATE AND CALCIUM CHLORIDE 20 ML/HR: 600; 310; 30; 20 INJECTION, SOLUTION INTRAVENOUS at 12:16

## 2025-05-19 RX ADMIN — PROPOFOL 50 MG: 10 INJECTION, EMULSION INTRAVENOUS at 13:05

## 2025-05-19 RX ADMIN — LIDOCAINE HYDROCHLORIDE 50 ML: 10 INJECTION, SOLUTION EPIDURAL; INFILTRATION; INTRACAUDAL; PERINEURAL at 12:27

## 2025-05-19 RX ADMIN — PROPOFOL 50 MG: 10 INJECTION, EMULSION INTRAVENOUS at 12:48

## 2025-05-19 RX ADMIN — PROPOFOL 50 MG: 10 INJECTION, EMULSION INTRAVENOUS at 12:42

## 2025-05-19 RX ADMIN — PROPOFOL 50 MG: 10 INJECTION, EMULSION INTRAVENOUS at 12:31

## 2025-05-19 RX ADMIN — PROPOFOL 50 MG: 10 INJECTION, EMULSION INTRAVENOUS at 12:55

## 2025-05-19 RX ADMIN — PROPOFOL 50 MG: 10 INJECTION, EMULSION INTRAVENOUS at 12:36

## 2025-05-19 SDOH — HEALTH STABILITY: MENTAL HEALTH: CURRENT SMOKER: 0

## 2025-05-19 ASSESSMENT — PAIN - FUNCTIONAL ASSESSMENT: PAIN_FUNCTIONAL_ASSESSMENT: 0-10

## 2025-05-19 ASSESSMENT — PAIN SCALES - GENERAL
PAINLEVEL_OUTOF10: 0 - NO PAIN
PAINLEVEL_OUTOF10: 0 - NO PAIN

## 2025-05-19 NOTE — ANESTHESIA PREPROCEDURE EVALUATION
Patient: Lauri Dolan    Procedure Information       Date/Time: 05/19/25 1300    Scheduled providers: Rena Starks MD; Jordi Davenport DO    Procedures:       COLONOSCOPY      EGD    Location: [facility] OR            Relevant Problems   Cardiac   (+) CAD (coronary artery disease)   (+) Hyperlipidemia   (+) PAD (peripheral artery disease)      Neuro   (+) Acute lumbar radiculopathy      GI   (+) Gastroesophageal reflux disease      /Renal   (+) BPH (benign prostatic hyperplasia)      Endocrine   (+) Hyperglycemia   (+) Hypothyroidism      Musculoskeletal   (+) DDD (degenerative disc disease), cervical      HEENT   (+) Sensorineural hearing loss (SNHL) of both ears      Hematology and Neoplasia   (+) History of lung cancer   There were no vitals filed for this visit.    Surgical History[1]  Medical History[2]  Current Medications[3]  Prior to Admission medications    Medication Sig Start Date End Date Taking? Authorizing Provider   acetaminophen (Tylenol) 325 mg tablet TAKE 1 TO 2 TABLETS EVERY 6 HOURS AS NEEDED. 5/21/21   Historical Provider, MD   albuterol 90 mcg/actuation inhaler INHALE 1 TO 2 PUFFS BY MOUTH EVERY 4 TO 6 HOURS AS NEEDED 7/14/20   Historical Provider, MD   atorvastatin (Lipitor) 40 mg tablet Take 1 tablet (40 mg) by mouth once daily. 3/30/25 3/30/26  Manuel Platt MD   clobetasol (Temovate) 0.05 % external solution APPLY TO SCALP TWICE DAILY FOR 14 DAYS AS NEEDED FOR FLARES/ITCHING.    Historical Provider, MD   clopidogrel (Plavix) 75 mg tablet Take 1 tablet by mouth once daily 2/5/25   Liz Fraser MD   cyclobenzaprine (Flexeril) 10 mg tablet Take 1 tablet by mouth three times daily as needed for muscle spasm 5/12/25   Liz Fraser MD   ketoconazole (NIZOral) 2 % shampoo Apply topically 2 times a week. 12/16/24   Liz Fraser MD   metroNIDAZOLE (Metrogel) 0.75 % gel Apply 1 Application topically 2 times a day. 7/30/24   Liz Fraser MD    pantoprazole (ProtoNix) 40 mg EC tablet Take 1 tablet (40 mg) by mouth once daily. Do not crush, chew, or split. 25  Liz Fraser MD   tamsulosin (Flomax) 0.4 mg 24 hr capsule TAKE 2 CAPSULES BY MOUTH ONCE DAILY AT BEDTIME 25   Christine Alvarez MD   tiotropium-olodateroL (Stiolto Respimat) 2.5-2.5 mcg/actuation mist inhaler Stiolto Respimat 2.5-2.5 MCG/ACT Inhalation Aerosol Solution INHALE 2 PUFFS BY MOUTH DAILY Quantity: 4 Refills: 11 Dequan Wright MD Start : 2019 Active 19   Historical Provider, MD   triamcinolone (Kenalog) 0.025 % cream Apply topically 3 times a day as needed for rash or irritation. Apply to affected areas 2-3 times daily 4/15/25   Liz Fraser MD     RX Allergies[4]  Social History     Tobacco Use    Smoking status: Former     Current packs/day: 0.00     Types: Cigarettes     Quit date: 10/29/2020     Years since quittin.5    Smokeless tobacco: Former     Types: Snuff   Substance Use Topics    Alcohol use: Not Currently         Chemistry    Lab Results   Component Value Date/Time     2025 1124    K 4.2 2025 1124     2025 1124    CO2 24 2025 1124    BUN 11 2025 1124    CREATININE 0.57 (L) 2025 1124    Lab Results   Component Value Date/Time    CALCIUM 8.8 2025 1124    ALKPHOS 57 2025 1124    AST 28 2025 1124    ALT 24 2025 1124    BILITOT 0.3 2025 1124          Lab Results   Component Value Date/Time    WBC 4.4 2025 1124    HGB 14.4 2025 1124    HCT 42.4 2025 1124     2025 1124     Lab Results   Component Value Date/Time    PROTIME 13.1 2022 1456    INR 1.1 2022 1456     Encounter Date: 25   ECG 12 lead (Clinic Performed)   Result Value    Ventricular Rate 97    Atrial Rate 97    MS Interval 174    QRS Duration 88    QT Interval 344    QTC Calculation(Bazett) 436    P Axis 70    R Axis 65    T Axis 55    QRS Count 16    Q  Onset 222    P Onset 135    P Offset 186    T Offset 394    QTC Fredericia 403    Narrative    Normal sinus rhythm  Normal ECG  When compared with ECG of 31-JAN-2024 10:46,  No significant change was found  Confirmed by Manuel Platt ([id number]) on 1/30/2025 9:32:39 PM     No results found for this or any previous visit from the past 1095 days.     Clinical information reviewed:   Tobacco  Allergies    Med Hx  Surg Hx   Fam Hx  Soc Hx      Stress  02/2023  IMPRESSION:  1. Negative myocardial perfusion study without evidence of inducible  myocardial ischemia or prior infarction.  2. The left ventricle is normal in size.  3. Normal LV wall motion with a post-stress LV EF estimated at 57%.  4. Small right pleural effusion.     NPO Detail:  No data recorded     Physical Exam    Airway  Mallampati: II     Cardiovascular - normal exam   Dental    Pulmonary - normal exam   Abdominal - normal exam           Anesthesia Plan    History of general anesthesia?: yes  History of complications of general anesthesia?: no    ASA 3     MAC     The patient is not a current smoker.    intravenous induction   Anesthetic plan and risks discussed with patient.    Plan discussed with attending.           [1]   Past Surgical History:  Procedure Laterality Date    CT ABDOMEN PELVIS ANGIOGRAM W AND/OR WO IV CONTRAST  03/15/2019    CT ABDOMEN PELVIS ANGIOGRAM W AND/OR WO IV CONTRAST 3/15/2019 [facility] CLINICAL LEGACY    CT ABDOMEN PELVIS ANGIOGRAM W AND/OR WO IV CONTRAST  06/26/2020    CT ABDOMEN PELVIS ANGIOGRAM W AND/OR WO IV CONTRAST 6/26/2020 [facility]CILL[facility] LEGAC[facility]    CT ABDOMEN PELVIS ANGIOGRAM W AND/OR WO IV CONTRAST  05/13/2021    CT ABDOMEN PELVIS ANGIOGRAM W AND/OR WO IV CONTRAST 5/13/2021 [facility]GAC[facility]    CT ANGIO AORTA AND BILATERAL ILIOFEMORAL RUN OFF INCLUDING WITHOUT CONTRAST IF PERFORMED  08/11/2020    CT AORTA AND BILATERAL ILIOFEMORAL RUNOFF ANGIOGRAM W AND/OR WO IV CONTRAST 8/11/2020 [facility] CLINICAL LEGACY    CT ANGIO AORTA  AND BILATERAL ILIOFEMORAL RUN OFF INCLUDING WITHOUT CONTRAST IF PERFORMED  02/10/2021    CT AORTA AND BILATERAL ILIOFEMORAL RUNOFF ANGIOGRAM W AND/OR WO IV CONTRAST 2/10/2021 [facility] CLINICAL LEGACY    CT ANGIO AORTA AND BILATERAL ILIOFEMORAL RUN OFF INCLUDING WITHOUT CONTRAST IF PERFORMED  05/19/2021    CT AORTA AND BILATERAL ILIOFEMORAL RUNOFF ANGIOGRAM W AND/OR WO IV CONTRAST 5/19/2021 [facility]    HEMICOLECTOMY  06/18/2018    Hemicolectomy    HERNIA REPAIR  06/18/2018    Inguinal Hernia Repair    LUNG CANCER SURGERY      wedge resection 2018    OTHER SURGICAL HISTORY  06/03/2019    Coronary artery bypass graft-1 vessel    OTHER SURGICAL HISTORY  05/18/2021    Vascular surgical procedure    OTHER SURGICAL HISTORY  04/12/2022    Lower extremity amputation above knee mid-thigh    OTHER SURGICAL HISTORY  08/31/2020    Femoral endarterectomy    OTHER SURGICAL HISTORY  08/31/2020    Abdominal aortic aneurysm repair endovascular    OTHER SURGICAL HISTORY  08/31/2020    Femoropopliteal bypass    OTHER SURGICAL HISTORY  04/08/2019    Cardiac catheterization   [2]   Past Medical History:  Diagnosis Date    Acute bronchitis 12/01/2023    Bilateral impacted cerumen 02/22/2024    BPH (benign prostatic hyperplasia)     Candidiasis 12/01/2023    COPD (chronic obstructive pulmonary disease) (Multi)     Coronary artery disease     Cough 12/01/2023    Lung cancer (Multi)     Myocardial infarction (Multi) 12/01/2023    Old myocardial infarction 03/26/2019    History of non-ST elevation myocardial infarction (NSTEMI)    PAD (peripheral artery disease) (CMS-[facility])     Personal history of other diseases of the respiratory system 05/21/2019    History of acute respiratory failure    Personal history of other diseases of the respiratory system 11/23/2021    History of acute bronchitis    Personal history of other malignant neoplasm of skin     History of malignant neoplasm of skin    Pharyngitis 09/05/2023    Wheezing 12/01/2023    [3]   Current Outpatient Medications:     acetaminophen (Tylenol) 325 mg tablet, TAKE 1 TO 2 TABLETS EVERY 6 HOURS AS NEEDED., Disp: , Rfl:     albuterol 90 mcg/actuation inhaler, INHALE 1 TO 2 PUFFS BY MOUTH EVERY 4 TO 6 HOURS AS NEEDED, Disp: , Rfl:     atorvastatin (Lipitor) 40 mg tablet, Take 1 tablet (40 mg) by mouth once daily., Disp: 90 tablet, Rfl: 3    clobetasol (Temovate) 0.05 % external solution, APPLY TO SCALP TWICE DAILY FOR 14 DAYS AS NEEDED FOR FLARES/ITCHING., Disp: , Rfl:     clopidogrel (Plavix) 75 mg tablet, Take 1 tablet by mouth once daily, Disp: 90 tablet, Rfl: 0    cyclobenzaprine (Flexeril) 10 mg tablet, Take 1 tablet by mouth three times daily as needed for muscle spasm, Disp: 90 tablet, Rfl: 1    ketoconazole (NIZOral) 2 % shampoo, Apply topically 2 times a week., Disp: 120 mL, Rfl: 2    metroNIDAZOLE (Metrogel) 0.75 % gel, Apply 1 Application topically 2 times a day., Disp: 15 g, Rfl: 2    pantoprazole (ProtoNix) 40 mg EC tablet, Take 1 tablet (40 mg) by mouth once daily. Do not crush, chew, or split., Disp: 30 tablet, Rfl: 1    tamsulosin (Flomax) 0.4 mg 24 hr capsule, TAKE 2 CAPSULES BY MOUTH ONCE DAILY AT BEDTIME, Disp: 120 capsule, Rfl: 0    tiotropium-olodateroL (Stiolto Respimat) 2.5-2.5 mcg/actuation mist inhaler, Stiolto Respimat 2.5-2.5 MCG/ACT Inhalation Aerosol Solution INHALE 2 PUFFS BY MOUTH DAILY Quantity: 4 Refills: 11 Dequan Wright MD Start : 8-Jul-2019 Active, Disp: , Rfl:     triamcinolone (Kenalog) 0.025 % cream, Apply topically 3 times a day as needed for rash or irritation. Apply to affected areas 2-3 times daily, Disp: 80 g, Rfl: 0    Current Facility-Administered Medications:     lactated Ringer's infusion, 20 mL/hr, intravenous, Continuous, Alonso Tuttle MD  [4]   Allergies  Allergen Reactions    Azithromycin Nausea And Vomiting, Other and Nausea/vomiting     shaking/tremors, nausea    Penicillins Rash

## 2025-05-19 NOTE — DISCHARGE INSTRUCTIONS
Patient Instructions after a Colonoscopy      The anesthetics, sedatives or narcotics which were given to you today will be acting in your body for the next 24 hours, so you might feel a little sleepy or groggy.  This feeling should slowly wear off. Carefully read and follow the instructions.     You received sedation today:  - Do not drive or operate any machinery or power tools of any kind.   - No alcoholic beverages today, not even beer or wine.  - Do not make any important decisions or sign any legal documents.  - No over the counter medications that contain alcohol or that may cause drowsiness.  - Do not make any important decisions or sign any legal documents.  - Make sure you have someone with you for first 24 hours.    While it is common to experience mild to moderate abdominal distention, gas, or belching after your procedure, if any of these symptoms occur following discharge from the GI Lab or within one week of having your procedure, call the [facility] to be advised whether a visit to your nearest Urgent Care or Emergency Department is indicated.  Take this paper with you if you go.     - If you develop an allergic reaction to the medications that were given during your procedure such as difficulty breathing, rash, hives, severe nausea, vomiting or lightheadedness.  - If you experience chest pain, shortness of breath, severe abdominal pain, fevers and chills.  -If you develop signs and symptoms of bleeding such as blood in your spit, if your stools turn black, tarry, or bloody  - If you have not urinated within 8 hours following your procedure.  - If your IV site becomes painful, red, inflamed, or looks infected.    If you received a biopsy/polypectomy/sphincterotomy the following instructions apply below:    __ Do not use Aspirin containing products, non-steroidal medications or anti-coagulants for one week following your procedure. (Examples of these types of medications are: Advil,  Arthrotec, Aleve, Coumadin, Ecotrin, Heparin, Ibuprofen, Indocin, Motrin, Naprosyn, Nuprin, Plavix, Vioxx, and Voltarin, or their generic forms.  This list is not all-inclusive.  Check with your physician or pharmacist before resuming medications.)   __ Eat a soft diet today.  Avoid foods that are poorly digested for the next 24 hours.  These foods would include: nuts, beans, lettuce, red meats, and fried foods. Start with liquids and advance your diet as tolerated, gradually work up to eating solids.   __ Do not have a Barium Study or Enema for one week.    Your physician recommends the additional following instructions:    -You have a contact number available for emergencies. The signs and symptoms of potential delayed complications were discussed with you. You may return to normal activities tomorrow.  -Resume your previous diet.  -Continue your present medications.   -We are waiting for your pathology results.  -Your physician has recommended a repeat colonoscopy (date to be determined after pending pathology results are reviewed) for surveillance based on pathology results.  -The findings and recommendations have been discussed with you.  -The findings and recommendations were discussed with your family.  - Please see Medication Reconciliation Form for new medication/medications prescribed.       If you experience any problems or have any questions following discharge from the GI Lab, please call:        Nurse Signature                                                                        Date___________________                                                                            Patient/Responsible Party Signature                                        Date___________________Patient Instructions after an endoscopy or colonoscopy      The anesthetics, sedatives or narcotics which were given to you today will be acting in your body for the next 24 hours, so you might feel a little sleepy or groggy.  This  feeling should slowly wear off. Carefully read and follow the instructions.     You received sedation today:  - Do not drive or operate any machinery or power tools of any kind.   - No alcoholic beverages today, not even beer or wine.  - Do not make any important decisions or sign any legal documents.  - No over the counter medications that contain alcohol or that may cause drowsiness.  - Do not make any important decisions or sign any legal documents.    While it is common to experience mild to moderate abdominal distention, gas, or belching after your procedure, if any of these symptoms occur following discharge from the GI Lab or within one week of having your procedure, call the [facility] to be advised whether a visit to your nearest Urgent Care or Emergency Department is indicated.  Take this paper with you if you go.     - If you develop an allergic reaction to the medications that were given during your procedure such as difficulty breathing, rash, hives, severe nausea, vomiting or lightheadedness.- If you experience chest pain, shortness of breath, severe abdominal pain, fevers and chills.    -If you develop signs and symptoms of bleeding such as blood in your spit, if your stools turn black, tarry, or bloody    - If you have not urinated within 8 hours following your procedure.- If your IV site becomes painful, red, inflamed, or looks infected.

## 2025-05-20 NOTE — ANESTHESIA POSTPROCEDURE EVALUATION
Patient: Lauri Dolan    Procedure Summary       Date: 05/19/25 Room / Location: [facility] OR    Anesthesia Start: 1220 Anesthesia Stop: 1330    Procedures:       COLONOSCOPY      EGD Diagnosis:       Colon cancer screening      Other acute gastritis without hemorrhage      Generalized abdominal pain    Scheduled Providers: Rena Starks MD; Jordi Davenport DO Responsible Provider: Jordi Davenport DO    Anesthesia Type: MAC ASA Status: 3            Anesthesia Type: MAC    Vitals Value Taken Time   /73 05/19/25 13:38   Temp 36.1 °C (97 °F) 05/19/25 13:23   Pulse 82 05/19/25 13:38   Resp 15 05/19/25 13:38   SpO2 98 % 05/19/25 13:38       Anesthesia Post Evaluation    Patient location during evaluation: PACU  Patient participation: complete - patient participated  Level of consciousness: awake and alert  Pain management: adequate  Airway patency: patent  Cardiovascular status: acceptable  Respiratory status: acceptable  Hydration status: acceptable  Postoperative Nausea and Vomiting: none        There were no known notable events for this encounter.

## 2025-06-02 ENCOUNTER — HOSPITAL ENCOUNTER (OUTPATIENT)
Dept: RADIOLOGY | Facility: HOSPITAL | Age: 70
Discharge: HOME | End: 2025-06-02
Payer: MEDICARE

## 2025-06-02 ENCOUNTER — APPOINTMENT (OUTPATIENT)
Dept: PULMONOLOGY | Facility: CLINIC | Age: 70
End: 2025-06-02
Payer: MEDICARE

## 2025-06-02 ENCOUNTER — APPOINTMENT (OUTPATIENT)
Dept: RADIOLOGY | Facility: HOSPITAL | Age: 70
End: 2025-06-02
Payer: MEDICARE

## 2025-06-02 DIAGNOSIS — C34.90 MALIGNANT NEOPLASM OF UNSPECIFIED PART OF UNSPECIFIED BRONCHUS OR LUNG (MULTI): ICD-10-CM

## 2025-06-02 DIAGNOSIS — C34.31 MALIGNANT NEOPLASM OF LOWER LOBE, RIGHT BRONCHUS OR LUNG: ICD-10-CM

## 2025-06-02 DIAGNOSIS — E03.9 HYPOTHYROIDISM, UNSPECIFIED: ICD-10-CM

## 2025-06-02 DIAGNOSIS — B37.0 THRUSH: ICD-10-CM

## 2025-06-02 LAB
LABORATORY COMMENT REPORT: NORMAL
PATH REPORT.FINAL DX SPEC: NORMAL
PATH REPORT.GROSS SPEC: NORMAL
PATH REPORT.RELEVANT HX SPEC: NORMAL
PATH REPORT.TOTAL CANCER: NORMAL

## 2025-06-02 PROCEDURE — 2550000001 HC RX 255 CONTRASTS: Performed by: INTERNAL MEDICINE

## 2025-06-02 PROCEDURE — 71260 CT THORAX DX C+: CPT

## 2025-06-02 RX ADMIN — IOHEXOL 50 ML: 350 INJECTION, SOLUTION INTRAVENOUS at 12:37

## 2025-06-03 ENCOUNTER — OFFICE VISIT (OUTPATIENT)
Dept: HEMATOLOGY/ONCOLOGY | Facility: CLINIC | Age: 70
End: 2025-06-03
Payer: MEDICARE

## 2025-06-03 VITALS
WEIGHT: 175.38 LBS | BODY MASS INDEX: 25.9 KG/M2 | SYSTOLIC BLOOD PRESSURE: 113 MMHG | OXYGEN SATURATION: 96 % | TEMPERATURE: 97.3 F | HEART RATE: 79 BPM | RESPIRATION RATE: 17 BRPM | DIASTOLIC BLOOD PRESSURE: 71 MMHG

## 2025-06-03 DIAGNOSIS — C34.31 MALIGNANT NEOPLASM OF LOWER LOBE, RIGHT BRONCHUS OR LUNG: ICD-10-CM

## 2025-06-03 PROCEDURE — 99214 OFFICE O/P EST MOD 30 MIN: CPT | Performed by: INTERNAL MEDICINE

## 2025-06-03 PROCEDURE — 1159F MED LIST DOCD IN RCRD: CPT | Performed by: INTERNAL MEDICINE

## 2025-06-03 PROCEDURE — 1126F AMNT PAIN NOTED NONE PRSNT: CPT | Performed by: INTERNAL MEDICINE

## 2025-06-03 ASSESSMENT — ENCOUNTER SYMPTOMS
CARDIOVASCULAR NEGATIVE: 1
GASTROINTESTINAL NEGATIVE: 1
CONSTITUTIONAL NEGATIVE: 1
RESPIRATORY NEGATIVE: 1

## 2025-06-03 ASSESSMENT — COLUMBIA-SUICIDE SEVERITY RATING SCALE - C-SSRS
6. HAVE YOU EVER DONE ANYTHING, STARTED TO DO ANYTHING, OR PREPARED TO DO ANYTHING TO END YOUR LIFE?: NO
2. HAVE YOU ACTUALLY HAD ANY THOUGHTS OF KILLING YOURSELF?: NO
1. IN THE PAST MONTH, HAVE YOU WISHED YOU WERE DEAD OR WISHED YOU COULD GO TO SLEEP AND NOT WAKE UP?: NO

## 2025-06-03 ASSESSMENT — PAIN SCALES - GENERAL: PAINLEVEL_OUTOF10: 0-NO PAIN

## 2025-06-03 NOTE — PROGRESS NOTES
Patient ID: Lauri Zuniga is a 70 y.o. male.  Referring Physician: Heather Mtz MD  [id number] [id number]  Primary Care Provider: Liz Fraser MD  Visit Type:  Follow Up     Subjective    HPI  He underwent wedge resection and lymph node dissection in August 2018.  Tumor was 1.3 cm lymph nodes were negative. Treated with Neoadjuvant chemotherapy followed by Radiation; Placed on maintenance Keytruda for 1 year.     Past medical history includes hypertension coronary disease.  He recently quit smoking.     ID Statement:    LAURI ZUNIGA is a 70 year old Male     Interval History:    Patient presents for follow treatment with his wife. DCed maintenance Keytruda, approximately 2 years reports no worsening in  muscle and joint pain, since being off Keytruda.  Has dry irritated eyes, otherwise doing well. Appetite and energy good.   Review of Systems   Constitutional: Negative.    HENT:  Negative.     Eyes: Negative.    Respiratory: Negative.     Cardiovascular: Negative.    Review of Systems   Constitutional: Negative.    HENT:  Negative.     Respiratory: Negative.     Cardiovascular: Negative.    Gastrointestinal: Negative.         Objective   BSA: 1.97 meters squared  /71 (BP Location: Left arm, Patient Position: Sitting, BP Cuff Size: Adult)   Pulse 79   Temp 36.3 °C (97.3 °F) (Temporal)   Resp 17   Wt 79.5 kg (175 lb 6 oz)   SpO2 96%   BMI 25.90 kg/m²      has a past medical history of Acute bronchitis (12/01/2023), Bilateral impacted cerumen (02/22/2024), BPH (benign prostatic hyperplasia), Candidiasis (12/01/2023), COPD (chronic obstructive pulmonary disease) (Multi), Coronary artery disease, Cough (12/01/2023), Lung cancer (Multi), Myocardial infarction (Multi) (12/01/2023), Old myocardial infarction (03/26/2019), PAD (peripheral artery disease), Personal history of other diseases of the respiratory system (05/21/2019), Personal history of other  diseases of the respiratory system (11/23/2021), Personal history of other malignant neoplasm of skin, Pharyngitis (09/05/2023), and Wheezing (12/01/2023).   has a past surgical history that includes Hemicolectomy (06/18/2018); Hernia repair (06/18/2018); Other surgical history (06/03/2019); Other surgical history (05/18/2021); Other surgical history (04/12/2022); Other surgical history (08/31/2020); Other surgical history (08/31/2020); Other surgical history (08/31/2020); Other surgical history (04/08/2019); CT angio abdomen pelvis w and or wo IV IV contrast (03/15/2019); CT angio abdomen pelvis w and or wo IV IV contrast (06/26/2020); CT angio aorta and bilateral iliofemoral runoff including without contrast if performed (08/11/2020); CT angio aorta and bilateral iliofemoral runoff including without contrast if performed (02/10/2021); CT angio abdomen pelvis w and or wo IV IV contrast (05/13/2021); CT angio aorta and bilateral iliofemoral runoff including without contrast if performed (05/19/2021); and Lung cancer surgery.  Family History[1]  Oncology History    No history exists.       Lauri Dolan  reports that he quit smoking about 4 years ago. His smoking use included cigarettes. He has quit using smokeless tobacco.  His smokeless tobacco use included snuff.  He  reports that he does not currently use alcohol.  He  reports that he does not currently use drugs.    Physical Exam  Constitutional:       Appearance: Normal appearance.   HENT:      Head: Normocephalic and atraumatic.   Eyes:      Extraocular Movements: Extraocular movements intact.      Pupils: Pupils are equal, round, and reactive to light.   Neurological:      Mental Status: He is alert.         WBC   Date/Time Value Ref Range Status   07/15/2024 03:21 PM 5.5 4.4 - 11.3 x10*3/uL Final   11/24/2023 11:40 AM 4.4 4.4 - 11.3 x10*3/uL Final   05/23/2023 09:40 AM 4.2 (L) 4.4 - 11.3 x10E9/L Final   05/02/2023 10:30 AM 4.9 4.4 - 11.3 x10E9/L Final    04/11/2023 10:44 AM 4.1 (L) 4.4 - 11.3 x10E9/L Final     WHITE BLOOD CELL COUNT   Date/Time Value Ref Range Status   04/28/2025 11:24 AM 4.4 3.8 - 10.8 Thousand/uL Final     nRBC   Date Value Ref Range Status   07/15/2024 0.0 0.0 - 0.0 /100 WBCs Final   05/14/2019 0.0 0.0 - 0.0 /100 WBC Final   05/13/2019 0.0 0.0 - 0.0 /100 WBC Final   05/12/2019 0.0 0.0 - 0.0 /100 WBC Final     RBC   Date Value Ref Range Status   07/15/2024 4.74 4.50 - 5.90 x10*6/uL Final   11/24/2023 4.37 (L) 4.50 - 5.90 x10*6/uL Final   05/23/2023 4.65 4.50 - 5.90 x10E12/L Final   05/02/2023 4.36 (L) 4.50 - 5.90 x10E12/L Final   04/11/2023 4.20 (L) 4.50 - 5.90 x10E12/L Final     RED BLOOD CELL COUNT   Date Value Ref Range Status   04/28/2025 4.48 4.20 - 5.80 Million/uL Final     Hemoglobin   Date Value Ref Range Status   07/15/2024 15.1 13.5 - 17.5 g/dL Final   11/24/2023 14.0 13.5 - 17.5 g/dL Final   05/23/2023 14.9 13.5 - 17.5 g/dL Final   05/02/2023 14.1 13.5 - 17.5 g/dL Final   04/11/2023 13.9 13.5 - 17.5 g/dL Final     HEMOGLOBIN   Date Value Ref Range Status   04/28/2025 14.4 13.2 - 17.1 g/dL Final     Hematocrit   Date Value Ref Range Status   07/15/2024 45.6 41.0 - 52.0 % Final   11/24/2023 42.5 41.0 - 52.0 % Final   05/23/2023 46.3 41.0 - 52.0 % Final   05/02/2023 42.4 41.0 - 52.0 % Final   04/11/2023 41.0 41.0 - 52.0 % Final     HEMATOCRIT   Date Value Ref Range Status   04/28/2025 42.4 38.5 - 50.0 % Final     MCV   Date/Time Value Ref Range Status   04/28/2025 11:24 AM 94.6 80.0 - 100.0 fL Final   07/15/2024 03:21 PM 96 80 - 100 fL Final   11/24/2023 11:40 AM 97 80 - 100 fL Final   05/23/2023 09:40  80 - 100 fL Final   05/02/2023 10:30 AM 97 80 - 100 fL Final   04/11/2023 10:44 AM 98 80 - 100 fL Final     MCH   Date/Time Value Ref Range Status   04/28/2025 11:24 AM 32.1 27.0 - 33.0 pg Final   07/15/2024 03:21 PM 31.9 26.0 - 34.0 pg Final   11/24/2023 11:40 AM 32.0 26.0 - 34.0 pg Final     MCHC   Date/Time Value Ref Range Status    04/28/2025 11:24 AM 34.0 32.0 - 36.0 g/dL Final     Comment:     For adults, a slight decrease in the calculated MCHC  value (in the range of 30 to 32 g/dL) is most likely  not clinically significant; however, it should be  interpreted with caution in correlation with other  red cell parameters and the patient's clinical  condition.     07/15/2024 03:21 PM 33.1 32.0 - 36.0 g/dL Final   11/24/2023 11:40 AM 32.9 32.0 - 36.0 g/dL Final   05/23/2023 09:40 AM 32.2 32.0 - 36.0 g/dL Final   05/02/2023 10:30 AM 33.3 32.0 - 36.0 g/dL Final   04/11/2023 10:44 AM 33.9 32.0 - 36.0 g/dL Final     RDW   Date/Time Value Ref Range Status   04/28/2025 11:24 AM 13.0 11.0 - 15.0 % Final   07/15/2024 03:21 PM 12.9 11.5 - 14.5 % Final   11/24/2023 11:40 AM 12.6 11.5 - 14.5 % Final   05/23/2023 09:40 AM 12.5 11.5 - 14.5 % Final   05/02/2023 10:30 AM 12.5 11.5 - 14.5 % Final   04/11/2023 10:44 AM 12.9 11.5 - 14.5 % Final     Platelets   Date/Time Value Ref Range Status   07/15/2024 03:21  150 - 450 x10*3/uL Final   11/24/2023 11:40  150 - 450 x10*3/uL Final   05/23/2023 09:40  150 - 450 x10E9/L Final   05/02/2023 10:30  150 - 450 x10E9/L Final   04/11/2023 10:44  150 - 450 x10E9/L Final     PLATELET COUNT   Date/Time Value Ref Range Status   04/28/2025 11:24  140 - 400 Thousand/uL Final     MPV   Date/Time Value Ref Range Status   04/28/2025 11:24 AM 10.8 7.5 - 12.5 fL Final     Neutrophils %   Date/Time Value Ref Range Status   11/24/2023 11:40 AM 70.7 40.0 - 80.0 % Final   05/23/2023 09:40 AM 66.2 40.0 - 80.0 % Final   05/02/2023 10:30 AM 65.6 40.0 - 80.0 % Final   04/11/2023 10:44 AM 66.1 40.0 - 80.0 % Final     Immature Granulocytes %, Automated   Date/Time Value Ref Range Status   11/24/2023 11:40 AM 0.2 0.0 - 0.9 % Final     Comment:     Immature Granulocyte Count (IG) includes promyelocytes, myelocytes and metamyelocytes but does not include bands. Percent differential counts (%) should be  interpreted in the context of the absolute cell counts (cells/UL).   05/23/2023 09:40 AM 0.0 0.0 - 0.9 % Final     Comment:      Immature Granulocyte Count (IG) includes promyelocytes,    myelocytes and metamyelocytes but does not include bands.   Percent differential counts (%) should be interpreted in the   context of the absolute cell counts (cells/L).     05/02/2023 10:30 AM 0.2 0.0 - 0.9 % Final     Comment:      Immature Granulocyte Count (IG) includes promyelocytes,    myelocytes and metamyelocytes but does not include bands.   Percent differential counts (%) should be interpreted in the   context of the absolute cell counts (cells/L).     04/11/2023 10:44 AM 0.0 0.0 - 0.9 % Final     Comment:      Immature Granulocyte Count (IG) includes promyelocytes,    myelocytes and metamyelocytes but does not include bands.   Percent differential counts (%) should be interpreted in the   context of the absolute cell counts (cells/L).       Lymphocytes %   Date/Time Value Ref Range Status   11/24/2023 11:40 AM 16.8 13.0 - 44.0 % Final   05/23/2023 09:40 AM 15.4 13.0 - 44.0 % Final     Comment:      Percent differential counts (%) should be interpreted in the   context of the absolute cell counts (cells/L).     05/02/2023 10:30 AM 16.8 13.0 - 44.0 % Final     Comment:      Percent differential counts (%) should be interpreted in the   context of the absolute cell counts (cells/L).     04/11/2023 10:44 AM 17.1 13.0 - 44.0 % Final     Comment:      Percent differential counts (%) should be interpreted in the   context of the absolute cell counts (cells/L).       LYMPHOCYTES   Date/Time Value Ref Range Status   04/28/2025 11:24 AM 25.2 % Final     Monocytes %   Date/Time Value Ref Range Status   11/24/2023 11:40 AM 8.6 2.0 - 10.0 % Final   05/23/2023 09:40 AM 10.5 2.0 - 10.0 % Final   05/02/2023 10:30 AM 11.5 2.0 - 10.0 % Final   04/11/2023 10:44 AM 11.2 2.0 - 10.0 % Final     MONOCYTES   Date/Time Value Ref Range Status    04/28/2025 11:24 AM 8.2 % Final     Eosinophils %   Date/Time Value Ref Range Status   11/24/2023 11:40 AM 3.2 0.0 - 6.0 % Final   05/23/2023 09:40 AM 7.4 0.0 - 6.0 % Final   05/02/2023 10:30 AM 5.3 0.0 - 6.0 % Final   04/11/2023 10:44 AM 5.1 0.0 - 6.0 % Final     EOSINOPHILS   Date/Time Value Ref Range Status   04/28/2025 11:24 AM 3.2 % Final     Basophils %   Date/Time Value Ref Range Status   11/24/2023 11:40 AM 0.5 0.0 - 2.0 % Final   05/23/2023 09:40 AM 0.5 0.0 - 2.0 % Final   05/02/2023 10:30 AM 0.6 0.0 - 2.0 % Final   04/11/2023 10:44 AM 0.5 0.0 - 2.0 % Final     BASOPHILS   Date/Time Value Ref Range Status   04/28/2025 11:24 AM 0.5 % Final     Neutrophils Absolute   Date/Time Value Ref Range Status   11/24/2023 11:40 AM 3.12 1.20 - 7.70 x10*3/uL Final     Comment:     Percent differential counts (%) should be interpreted in the context of the absolute cell counts (cells/uL).   05/23/2023 09:40 AM 2.78 1.20 - 7.70 x10E9/L Final   05/02/2023 10:30 AM 3.21 1.20 - 7.70 x10E9/L Final   04/11/2023 10:44 AM 2.71 1.20 - 7.70 x10E9/L Final     Immature Granulocytes Absolute, Automated   Date/Time Value Ref Range Status   11/24/2023 11:40 AM 0.01 0.00 - 0.70 x10*3/uL Final     Lymphocytes Absolute   Date/Time Value Ref Range Status   11/24/2023 11:40 AM 0.74 (L) 1.20 - 4.80 x10*3/uL Final   05/23/2023 09:40 AM 0.65 (L) 1.20 - 4.80 x10E9/L Final   05/02/2023 10:30 AM 0.82 (L) 1.20 - 4.80 x10E9/L Final   04/11/2023 10:44 AM 0.70 (L) 1.20 - 4.80 x10E9/L Final     Monocytes Absolute   Date/Time Value Ref Range Status   11/24/2023 11:40 AM 0.38 0.10 - 1.00 x10*3/uL Final   05/23/2023 09:40 AM 0.44 0.10 - 1.00 x10E9/L Final   05/02/2023 10:30 AM 0.56 0.10 - 1.00 x10E9/L Final   04/11/2023 10:44 AM 0.46 0.10 - 1.00 x10E9/L Final     Eosinophils Absolute   Date/Time Value Ref Range Status   11/24/2023 11:40 AM 0.14 0.00 - 0.70 x10*3/uL Final   05/23/2023 09:40 AM 0.31 0.00 - 0.70 x10E9/L Final   05/02/2023 10:30 AM 0.26  "0.00 - 0.70 x10E9/L Final   04/11/2023 10:44 AM 0.21 0.00 - 0.70 x10E9/L Final     ABSOLUTE EOSINOPHILS   Date/Time Value Ref Range Status   04/28/2025 11:24  15 - 500 cells/uL Final     Basophils Absolute   Date/Time Value Ref Range Status   11/24/2023 11:40 AM 0.02 0.00 - 0.10 x10*3/uL Final   05/23/2023 09:40 AM 0.02 0.00 - 0.10 x10E9/L Final   05/02/2023 10:30 AM 0.03 0.00 - 0.10 x10E9/L Final   04/11/2023 10:44 AM 0.02 0.00 - 0.10 x10E9/L Final     ABSOLUTE BASOPHILS   Date/Time Value Ref Range Status   04/28/2025 11:24 AM 22 0 - 200 cells/uL Final       No components found for: \"PT\"  aPTT   Date/Time Value Ref Range Status   02/10/2021 04:40 PM 21 (L) 25 - 35 sec Final     Comment:       THE APTT IS NO LONGER USED FOR MONITORING     UNFRACTIONATED HEPARIN THERAPY.    FOR MONITORING HEPARIN THERAPY,     USE THE HEPARIN ASSAY.     08/14/2020 08:12 AM 21 (L) 25 - 35 sec Final     Comment:      Note new reference range as of 05/26/2020.    THE APTT IS NO LONGER USED FOR MONITORING     UNFRACTIONATED HEPARIN THERAPY.    FOR MONITORING HEPARIN THERAPY,     USE THE HEPARIN ASSAY.     08/11/2020 03:30 PM 30 25 - 35 sec Final     Comment:      Note new reference range as of 05/26/2020.    THE APTT IS NO LONGER USED FOR MONITORING     UNFRACTIONATED HEPARIN THERAPY.    FOR MONITORING HEPARIN THERAPY,     USE THE HEPARIN ASSAY.         Assessment/Plan      TRACY recurrence:    Recent history of non-small cell lung cancer right status post chemoradiation.  No evidence of disease recurrence or persistence.  Will continue surveillance    DC from Oncology: Return PRN     Diagnoses and all orders for this visit:  Malignant neoplasm of lower lobe, right bronchus or lung  -     Clinic Appointment Request Follow Up; SAIDA LAUREN MD                              [1] No family history on file.    "

## 2025-06-08 DIAGNOSIS — R35.0 BENIGN PROSTATIC HYPERPLASIA WITH URINARY FREQUENCY: ICD-10-CM

## 2025-06-08 DIAGNOSIS — I25.10 CORONARY ARTERY DISEASE INVOLVING NATIVE CORONARY ARTERY OF NATIVE HEART WITHOUT ANGINA PECTORIS: ICD-10-CM

## 2025-06-08 DIAGNOSIS — K29.00 OTHER ACUTE GASTRITIS WITHOUT HEMORRHAGE: ICD-10-CM

## 2025-06-08 DIAGNOSIS — N40.1 BENIGN PROSTATIC HYPERPLASIA WITH URINARY FREQUENCY: ICD-10-CM

## 2025-06-08 DIAGNOSIS — Z12.11 COLON CANCER SCREENING: ICD-10-CM

## 2025-06-08 DIAGNOSIS — R10.84 GENERALIZED ABDOMINAL PAIN: ICD-10-CM

## 2025-06-09 RX ORDER — PANTOPRAZOLE SODIUM 40 MG/1
40 TABLET, DELAYED RELEASE ORAL DAILY
Qty: 90 TABLET | Refills: 1 | Status: SHIPPED | OUTPATIENT
Start: 2025-06-09

## 2025-06-09 RX ORDER — CLOPIDOGREL BISULFATE 75 MG/1
75 TABLET ORAL DAILY
Qty: 90 TABLET | Refills: 1 | Status: SHIPPED | OUTPATIENT
Start: 2025-06-09

## 2025-06-09 RX ORDER — TAMSULOSIN HYDROCHLORIDE 0.4 MG/1
0.8 CAPSULE ORAL NIGHTLY
Qty: 180 CAPSULE | Refills: 0 | Status: SHIPPED | OUTPATIENT
Start: 2025-06-09

## 2025-06-09 RX ORDER — TAMSULOSIN HYDROCHLORIDE 0.4 MG/1
0.8 CAPSULE ORAL NIGHTLY
Qty: 120 CAPSULE | Refills: 0 | OUTPATIENT
Start: 2025-06-09

## 2025-06-12 ENCOUNTER — APPOINTMENT (OUTPATIENT)
Dept: PULMONOLOGY | Facility: CLINIC | Age: 70
End: 2025-06-12
Payer: MEDICARE

## 2025-08-03 DIAGNOSIS — L71.9 ROSACEA: ICD-10-CM

## 2025-08-04 RX ORDER — METRONIDAZOLE TOPICAL 7.5 MG/G
GEL TOPICAL
Qty: 45 G | Refills: 0 | Status: SHIPPED | OUTPATIENT
Start: 2025-08-04

## 2025-08-11 DIAGNOSIS — L71.9 ROSACEA: ICD-10-CM

## 2025-08-11 RX ORDER — CLOBETASOL PROPIONATE 0.5 MG/ML
SOLUTION TOPICAL 2 TIMES DAILY
Qty: 25 ML | Refills: 2 | Status: SHIPPED | OUTPATIENT
Start: 2025-08-11